# Patient Record
Sex: FEMALE | Race: WHITE | NOT HISPANIC OR LATINO | Employment: FULL TIME | URBAN - METROPOLITAN AREA
[De-identification: names, ages, dates, MRNs, and addresses within clinical notes are randomized per-mention and may not be internally consistent; named-entity substitution may affect disease eponyms.]

---

## 2017-08-30 ENCOUNTER — TELEPHONE (OUTPATIENT)
Dept: FAMILY MEDICINE | Facility: CLINIC | Age: 47
End: 2017-08-30

## 2017-08-30 NOTE — TELEPHONE ENCOUNTER
Patient has been called and advised that we will add her to the wait list for a new patient appointment, she has verbalized full understanding.

## 2017-08-30 NOTE — TELEPHONE ENCOUNTER
----- Message from Jean-Pierre Marshall sent at 8/30/2017  2:49 PM CDT -----  Contact: same  Patient called in and stated her PCP referred her to see Dr. Newman and stated she has thyroid issues and also Type 2 diabetes.  Patient stated she is on medication but not insulin.  Patient would like a call back to schedule an appt at [phone]

## 2018-10-26 ENCOUNTER — TELEPHONE (OUTPATIENT)
Dept: UROLOGY | Facility: CLINIC | Age: 48
End: 2018-10-26

## 2018-10-26 NOTE — TELEPHONE ENCOUNTER
Spoke with patient informed her referral received from [facility]. Patient states she will call back to schedule a appointment.

## 2018-10-29 ENCOUNTER — TELEPHONE (OUTPATIENT)
Dept: UROLOGY | Facility: CLINIC | Age: 48
End: 2018-10-29

## 2018-10-29 NOTE — TELEPHONE ENCOUNTER
----- Message from Helen Peters sent at 10/29/2018  4:13 PM CDT -----  Contact: Self  Type:  Patient Returning Call    Who Called:  Patient   Who Left Message for Patient:  Nathaly   Does the patient know what this is regarding?:    Best Call Back Number:  [phone] (home)     Additional Information:

## 2018-10-29 NOTE — TELEPHONE ENCOUNTER
Please let her know that I can see her next Monday at 11:30am.   In the meantime please have her images uploaded and let me know once they have been uploaded.

## 2018-10-29 NOTE — TELEPHONE ENCOUNTER
Spoke with patient called patient on 10/26 to offer appointment with  patient declined appointment. Patient calling today requesting appointment with  for kidney stones appointment scheduled on 11/19. Patient proceeds to tell me a story about how her family talked her into scheduling appointment with  and she sat in 's office for 2 hours and was then told he was in surgery and patient cannot be seen. Patient requesting sooner appointment. Paperwork placed in review folder

## 2018-10-30 NOTE — TELEPHONE ENCOUNTER
Spoke with patient appointment scheduled for Monday 11/5 at 11am. Patient verbally voiced understanding.

## 2018-11-05 ENCOUNTER — HOSPITAL ENCOUNTER (OUTPATIENT)
Dept: RADIOLOGY | Facility: HOSPITAL | Age: 48
Discharge: HOME | End: 2018-11-05
Attending: UROLOGY
Payer: COMMERCIAL

## 2018-11-05 ENCOUNTER — OFFICE VISIT (OUTPATIENT)
Dept: UROLOGY | Facility: CLINIC | Age: 48
End: 2018-11-05
Payer: COMMERCIAL

## 2018-11-05 VITALS
DIASTOLIC BLOOD PRESSURE: 89 MMHG | SYSTOLIC BLOOD PRESSURE: 157 MMHG | WEIGHT: 148.5 LBS | HEIGHT: 65 IN | HEART RATE: 108 BPM | BODY MASS INDEX: 24.74 KG/M2

## 2018-11-05 DIAGNOSIS — R31.0 GROSS HEMATURIA: ICD-10-CM

## 2018-11-05 DIAGNOSIS — R31.0 GROSS HEMATURIA: Primary | ICD-10-CM

## 2018-11-05 DIAGNOSIS — N20.0 NEPHROLITHIASIS: ICD-10-CM

## 2018-11-05 DIAGNOSIS — N39.41 URGE INCONTINENCE OF URINE: ICD-10-CM

## 2018-11-05 LAB
BACTERIA #/AREA URNS HPF: ABNORMAL /HPF
BILIRUB UR QL STRIP: NEGATIVE
CLARITY UR: CLEAR
COLOR UR: YELLOW
GLUCOSE UR QL STRIP: ABNORMAL
HGB UR QL STRIP: ABNORMAL
KETONES UR QL STRIP: NEGATIVE
LEUKOCYTE ESTERASE UR QL STRIP: NEGATIVE
MICROSCOPIC COMMENT: ABNORMAL
NITRITE UR QL STRIP: NEGATIVE
PH UR STRIP: 7 [PH] (ref 5–8)
PROT UR QL STRIP: ABNORMAL
RBC #/AREA URNS HPF: >100 /HPF (ref 0–4)
SP GR UR STRIP: 1.02 (ref 1–1.03)
SQUAMOUS #/AREA URNS HPF: 2 /HPF
URN SPEC COLLECT METH UR: ABNORMAL
UROBILINOGEN UR STRIP-ACNC: NEGATIVE EU/DL
WBC #/AREA URNS HPF: 3 /HPF (ref 0–5)

## 2018-11-05 PROCEDURE — 99245 OFF/OP CONSLTJ NEW/EST HI 55: CPT | Mod: 25,S$GLB,, | Performed by: UROLOGY

## 2018-11-05 PROCEDURE — 99999 PR PBB SHADOW E&M-EST. PATIENT-LVL V: CPT | Mod: PBBFAC,,, | Performed by: UROLOGY

## 2018-11-05 PROCEDURE — 81000 URINALYSIS NONAUTO W/SCOPE: CPT

## 2018-11-05 PROCEDURE — 74018 RADEX ABDOMEN 1 VIEW: CPT | Mod: TC,FY

## 2018-11-05 PROCEDURE — 51701 INSERT BLADDER CATHETER: CPT | Mod: S$GLB,,, | Performed by: UROLOGY

## 2018-11-05 PROCEDURE — 87086 URINE CULTURE/COLONY COUNT: CPT

## 2018-11-05 PROCEDURE — 74018 RADEX ABDOMEN 1 VIEW: CPT | Mod: 26,,, | Performed by: RADIOLOGY

## 2018-11-05 RX ORDER — TRAMADOL HYDROCHLORIDE 50 MG/1
50 TABLET ORAL
COMMUNITY
Start: 2018-10-25 | End: 2020-06-18 | Stop reason: ALTCHOICE

## 2018-11-05 RX ORDER — TAMSULOSIN HYDROCHLORIDE 0.4 MG/1
0.4 CAPSULE ORAL
Qty: 30 CAPSULE | Refills: 0 | Status: SHIPPED | OUTPATIENT
Start: 2018-11-05 | End: 2018-11-28 | Stop reason: SDUPTHER

## 2018-11-05 RX ORDER — HYDROCHLOROTHIAZIDE 25 MG/1
TABLET ORAL
COMMUNITY
Start: 2018-08-06

## 2018-11-05 RX ORDER — LISDEXAMFETAMINE DIMESYLATE 50 MG/1
50 CAPSULE ORAL
COMMUNITY
Start: 2018-10-25 | End: 2021-01-14

## 2018-11-05 RX ORDER — BENAZEPRIL HYDROCHLORIDE 40 MG/1
TABLET ORAL
COMMUNITY
Start: 2018-08-06 | End: 2020-10-06 | Stop reason: SDUPTHER

## 2018-11-05 RX ORDER — KETOROLAC TROMETHAMINE 10 MG/1
10 TABLET, FILM COATED ORAL EVERY 8 HOURS PRN
Qty: 10 TABLET | Refills: 0 | Status: SHIPPED | OUTPATIENT
Start: 2018-11-05 | End: 2018-11-10

## 2018-11-05 NOTE — PROGRESS NOTES
[facility] Note - abeba clancy  Staff: MD Yakov    Referring provider and please cc:   PC[facility] - will sign up again    Chief Complaint: Left renal stones    Subjective:        HPI: Alis Costello is a 48 y.o. female presents with     Nephrolithiasis and Gross Hematuria  -she has a h/o distal right ureteral stone and had a ureteroscopy and stone extraction on 8/6/12, this was her first stone requiring extraction, prior to this she had 3 or 4 she passed and since then she's had no other stones. About a week and a half ago she had uncomfortable but tolerable pain in his left flank. She went to her pcp and initially had leuk and blood on 10/15/18 and was placed on abx. She then returned a week later 10/25/18 and that urine showed 4+blood. She then had a ctrss on 10/26/18 which showed a 5x7mm L renal pelvis stone and 3 mm LLP stone.  then a She's had intermittent hematuria without dysuria, no fevers, no chills since. No stones on the right. Still continues to have intermittent discomfort.     No h/o smoking and on no aspirin. Has had problems urinating for the past 3 months, some incontinence but has since resolved. She is diabetic on victoza. a1c 6.0     ua today: tr leuk/30 protein/250 glucose/250 blood  ua cath: sent for ua, ua microscopic, culture- no sx   Urine history:   10/25/18 No cx, void: 4+blood  10/15/18 No cx, void: tr leuk/ 4+blood  8/5/12  2+blood    ECOG Status: 0    REVIEW OF SYSTEMS:  General ROS: no fevers, no chills  Psychological ROS: no depression  Endocrine ROS: no heat or cold  Respiratory ROS: no SOB  Cardiovascular ROS: no CP  Gastrointestinal ROS: no abdominal pain, no constipation, no diarrhea, noBRBPR  Musculoskeletal ROS: no muscle pain  Neurological ROS: no headaches  Dermatological ROS: no rashes  HEENT: noglasses, no sinus   ROS: per HPI     PMHx:  Past Medical History:   Diagnosis Date    Abnormal Pap smear       Maxine told her it was okay since having hysterectomy.    Anemia     Diabetes mellitus     Thyroid disease      Kidney stones: yes    PSHx:  Past Surgical History:   Procedure Laterality Date    Cystoscope      CYSTOSCOPY, WITH RETROGRADE PYELOGRAM Right 8/6/2012    [facility] OR    DENTAL SURGERY      ENDOMETRIAL ABLATION  2009    HYSTERECTOMY      HYSTERECTOMY-VAGINAL-LAPAROSCOPIC (LAVH) N/A 3/2/2015    [facility]ola [facility] at [facility] OR    REMOVAL, CALCULUS, URETER, URETEROSCOPIC Right 8/6/2012    [facility] at [facility] OR    SINUS SURGERY  2002     Urologic or Gynecologic Surgery: ureteroscopy, hysterectomy     Stents/Valves/Foreign Bodies: No  Cardiac Evaluation: No    Screening Studies  Colonoscopy: none    Fam Hx:   malignancies: No , gyn malignancies: No.  Mother with h/o stroke  kidney stones: Yes - mother      Soc Hx:  No tobacco.   occ alcohol  Lives in [place]  :yes  Children: 2  Occupation:works [employer], finance    Allergies:  Patient has no known allergies.    Medications: reviewed   Anticoagulation: No    Objective:     Vitals:    11/05/18 1123   BP: (!) 157/89   Pulse: 108       General:WDWN in NAD  Eyes: PERRLA, normal conjunctiva  Respiratory: no increased work on breathing. No wheezing.   Cardiovascular: No obvious extremity edema. Warm and well perfused.   GI: no palpation of masses. No tenderness. No hepatosplenomegaly to palpation.  Musculoskeletal: normal range of motion of bilateral upper extremities. Normal muscle strength and tone.  Skin: no obvious rashes or lesions. No tightening of skin noted.  Neurologic: CN grossly normal. Normal sensation.   Psychiatric: awake, alert and oriented x 3. Mood and affect normal. Cooperative.    Pelvic exam  In and out cath today with 80cc` residual, urine sent    LABS REVIEW:      Lab Results   Component Value Date    WBC 6.90 03/02/2015    HGB 10.7 (L)  03/02/2015    HCT 33.2 (L) 03/02/2015    MCV 80 (L) 03/02/2015     03/02/2015     BMP  Lab Results   Component Value Date     02/27/2015    K 3.8 02/27/2015     02/27/2015    CO2 27 02/27/2015    BUN 7 02/27/2015    CREATININE 0.8 02/27/2015    CALCIUM 9.6 02/27/2015    ANIONGAP 6 (L) 02/27/2015    ESTGFRAFRICA >60 02/27/2015    EGFRNONAA >60 02/27/2015         PATHOLOGY REVIEW:  none    RADIOGRAPHIC REVIEW:  ctrss 10/26/18  The right kidney and right ureter are normal.  The left kidney is normal in size.  There is a 3 mm size calculus in the one of the lower pole infundibuli of the left kidney.  There is also a 5 x 7 mm size calculus in the left renal pelvis.  There are no additional calculi in the left ureter and there is no evidence of urinary obstruction.      Assessment:       1. Gross hematuria    2. Nephrolithiasis    3. Urge incontinence of urine          Plan:     Gross hematuria and Left UPJ stone and LLP stone  She has a left renal pelvic stone that is likely going in and out of her upj explaining her intermittent hematuria and pain. Went over eswl vs ureteroscopy. She wants to proceed with ureteroscopy to be able to remove both the left pelvic stone and left lower pole stone.    Start flomax 0.4mg nightly in case stone not visible and need to do ureteroscopy. Explained if we have to do this ureteroscopy may have stent first for 2 weeks and then ureteroscopy 2 weeks later or stent after if able to get to stone. flomax helps stretch ureter to allow instruments and stone passage. Nov 14th.     Send cath urine for urinalysis and culture. She is feeling fatigued but has no uti sx.   Bmp and cbc  kub morning of surgery to ensure it is has not moved     cystoscopy (flexible on the table prior to starting) to finish hematuria workup. Very likely the blood in urine is from the stone.    If urine shows supsicion for infection then needs stent this Wednesday and stone treatment two weeks later.      I have routed a letter back to  for the consult on date of service 11/5/18.               Alexandra Nagy MD

## 2018-11-05 NOTE — PATIENT INSTRUCTIONS
Gross hematuria and Left UPJ stone and LLP stone  She has a left renal pelvic stone that is likely going in and out of her upj explaining her intermittent hematuria and pain. Went over eswl vs ureteroscopy. She wants to proceed with ureteroscopy to be able to remove both the left pelvic stone and left lower pole stone.    Start flomax 0.4mg nightly in case stone not visible and need to do ureteroscopy. Explained if we have to do this ureteroscopy may have stent first for 2 weeks and then ureteroscopy 2 weeks later or stent after if able to get to stone. flomax helps stretch ureter to allow instruments and stone passage. Nov 14th.     Send cath urine for urinalysis and culture. She is feeling fatigued but has no uti sx.   Bmp and cbc  kub morning of surgery to ensure it is has not moved     cystoscopy (flexible on the table prior to starting) to finish hematuria workup. Very likely the blood in urine is from the stone.

## 2018-11-05 NOTE — LETTER
November 5, 2018      Ty Elaine MD  [address]  [place]  [place] [address]           [place] [facility]  [address] [place] [address] [address]  [place] [address]  Phone: [phone]  Fax: [phone]          Patient: Alis Costello   MR Number: [id number]   YOB: 1970   Date of Visit: 11/5/2018       Dear Dr. Ty Elaine:    Thank you for referring Alis Costello to me for evaluation. Attached you will find relevant portions of my assessment and plan of care.    If you have questions, please do not hesitate to call me. I look forward to following Alis Costello along with you.    Sincerely,    Alexandra Nagy MD    Enclosure  CC:  No Recipients    If you would like to receive this communication electronically, please contact [email or web address] or [phone] to request more information on [facility] access.    For providers and/or their staff who would like to refer a patient to [facility], please contact us through our one-stop-shop provider referral line, [facility], at [phone].    If you feel you have received this communication in error or would no longer like to receive these types of communications, please e-mail [email or web address]

## 2018-11-06 LAB — BACTERIA UR CULT: NO GROWTH

## 2018-11-07 ENCOUNTER — PATIENT MESSAGE (OUTPATIENT)
Dept: SURGERY | Facility: HOSPITAL | Age: 48
End: 2018-11-07

## 2018-11-08 ENCOUNTER — TELEPHONE (OUTPATIENT)
Dept: UROLOGY | Facility: CLINIC | Age: 48
End: 2018-11-08

## 2018-11-08 NOTE — TELEPHONE ENCOUNTER
Patient has further questions concerning the eswl, and is communicating with the MD thru the patient email portal.

## 2018-11-08 NOTE — TELEPHONE ENCOUNTER
----- Message from Alexandra Nagy MD sent at 11/8/2018 12:20 AM CST -----  See if she wants to do eswl instead. If so need to let or know to add eswl and possible stent to procedure and remove ureteroscopy.

## 2018-11-13 ENCOUNTER — ANESTHESIA EVENT (OUTPATIENT)
Dept: SURGERY | Facility: HOSPITAL | Age: 48
End: 2018-11-13
Payer: COMMERCIAL

## 2018-11-13 NOTE — PRE-PROCEDURE INSTRUCTIONS
Pre-op phone call made to pt.  All medications reviewed and  pre-procedure  instructions given to pt.  Pt verbalized understanding.

## 2018-11-13 NOTE — H&P
[facility] H[facility]P Note - [place]           l  Staff: MD Yakov  PCP: [facility]      Chief Complaint: Left renal stones     Subjective:        HPI: Alis Costello is a 48 y.o. female presents with      Nephrolithiasis and Gross Hematuria  -she has a h/o distal right ureteral stone and had a ureteroscopy and stone extraction on 8/6/12, this was her first stone requiring extraction, prior to this she had 3 or 4 she passed and since then she's had no other stones. About a week and a half ago she had uncomfortable but tolerable pain in his left flank. She went to her pcp and initially had leuk and blood on 10/15/18 and was placed on abx. She then returned a week later 10/25/18 and that urine showed 4+blood. She then had a ctrss on 10/26/18 which showed a 5x7mm L renal pelvis stone and 3 mm LLP stone.  then a She's had intermittent hematuria without dysuria, no fevers, no chills since. No stones on the right. Still continues to have intermittent discomfort.      No h/o smoking and on no aspirin. Has had problems urinating for the past 3 months, some incontinence but has since resolved. She is diabetic on victoza. a1c 6.0     kub showed stone visible on xray    After discussion of treatment options through in clinic and through epic messaging, because of her low tolerance for pain she has decided on eswl with possible stent if not treated    Urine history:   11/5/18 Ng, cath: 3+blood/void: tr leuk/250 blood  10/25/18          No cx, void: 4+blood  10/15/18          No cx, void: tr leuk/ 4+blood  8/5/12              2+blood     ECOG Status: 0     REVIEW OF SYSTEMS:  General ROS: no fevers, no chills  Psychological ROS: no depression  Endocrine ROS: no heat or cold  Respiratory ROS: no SOB  Cardiovascular ROS: no CP  Gastrointestinal ROS: no abdominal pain, no constipation, no diarrhea, noBRBPR  Musculoskeletal ROS: no muscle pain  Neurological ROS: no headaches  Dermatological  ROS: no rashes  HEENT: noglasses, no sinus   ROS: per HPI     PMHx:       Past Medical History:   Diagnosis Date    Abnormal Pap smear       Dr. Goodman told her it was okay since having hysterectomy.    Anemia      Diabetes mellitus      Thyroid disease        Kidney stones: yes     PSHx:        Past Surgical History:   Procedure Laterality Date    Cystoscope        CYSTOSCOPY, WITH RETROGRADE PYELOGRAM Right 8/6/2012     [facility] at [facility] OR    DENTAL SURGERY        ENDOMETRIAL ABLATION   2009    HYSTERECTOMY        HYSTERECTOMY-VAGINAL-LAPAROSCOPIC ([facility]) N/A 3/2/2015     [facility]ola [facility] at [facility] OR    REMOVAL, CALCULUS, URETER, URETEROSCOPIC Right 8/6/2012     [facility] at [facility] OR    SINUS SURGERY   2002      Urologic or Gynecologic Surgery: ureteroscopy, hysterectomy      Stents/Valves/Foreign Bodies: No  Cardiac Evaluation: No     Screening Studies  Colonoscopy: none     Fam Hx:   malignancies: No , gyn malignancies: No.  Mother with h/o stroke  kidney stones: Yes - mother       Soc Hx:  No tobacco.   occ alcohol  Lives in [place]  :yes  Children: 2  Occupation:works [employer], finance     Allergies:  Patient has no known allergies.     Medications: reviewed   Anticoagulation: No     Objective:      Vitals:    11/14/18 0804   BP: 121/65   Pulse: (!) 54   Resp: 16   Temp: 98.4 °F (36.9 °C)          General:WDWN in NAD  Eyes: PERRLA, normal conjunctiva  Respiratory: no increased work on breathing. No wheezing.   Cardiovascular: No obvious extremity edema. Warm and well perfused.   GI: no palpation of masses. No tenderness. No hepatosplenomegaly to palpation.  Musculoskeletal: normal range of motion of bilateral upper extremities. Normal muscle strength and tone.  Skin: no obvious rashes or lesions. No tightening of skin noted.  Neurologic: CN grossly normal. Normal sensation.   Psychiatric: awake, alert and  oriented x 3. Mood and affect normal. Cooperative.     Pelvic exam 11/5/18  In and out cath with 80cc` residual, urine sent     LABS REVIEW:     BMP  Lab Results   Component Value Date     11/05/2018    K 4.0 11/05/2018     11/05/2018    CO2 27 11/05/2018    BUN 8 11/05/2018    CREATININE 1.1 11/05/2018    CALCIUM 10.1 11/05/2018    ANIONGAP 8 11/05/2018    ESTGFRAFRICA >60 11/05/2018    EGFRNONAA 59 (A) 11/05/2018     Lab Results   Component Value Date    WBC 7.00 11/05/2018    HGB 13.2 11/05/2018    HCT 40.0 11/05/2018    MCV 86 11/05/2018     11/05/2018           PATHOLOGY REVIEW:  none     RADIOGRAPHIC REVIEW:  kub 11/5/18  Irregular projection over the left kidney    ctrss 10/26/18  The right kidney and right ureter are normal.  The left kidney is normal in size.  There is a 3 mm size calculus in the one of the lower pole infundibuli of the left kidney.  There is also a 5 x 7 mm size calculus in the left renal pelvis. SSD 8mm  There are no additional calculi in the left ureter and there is no evidence of urinary obstruction.        Assessment:       1. Gross hematuria    2. Nephrolithiasis    3. Urge incontinence of urine           Plan:      Gross hematuria and Left UPJ stone and LLP stone  She has a left renal pelvic stone that is likely going in and out of her upj explaining her intermittent hematuria and pain. Went over eswl vs ureteroscopy. She wants to proceed with ureteroscopy to be able to remove both the left pelvic stone and left lower pole stone.     Has been on flomax 0.4mg nightly in case stone not visible and need to do ureteroscopy. Explained if we have to do this ureteroscopy may have stent first for 2 weeks and then ureteroscopy 2 weeks later or stent after if able to get to stone. flomax helps stretch ureter to allow instruments and stone passage. Nov 14th.   Because she is very concerned about the pain we have decided to try eswl first and if it does not respond will do  stent and plan for urs 2 weeks later        cystoscopy (flexible on the table prior to starting) to finish hematuria workup. Very likely the blood in urine is from the stone.     If urine shows supsicion for infection then needs stent  First and stone treatment two weeks later.        H&P update  I have reviewed the relevant H&P and reviewed the relevant labs and radiology and updated today's H&P.  The pt is on no anticoagulation and has not been for the past 7 days  Most recent urine culture was on 11/5/18 and was ng.  The patient denies any uti symptoms including fever or burning.

## 2018-11-14 ENCOUNTER — HOSPITAL ENCOUNTER (OUTPATIENT)
Facility: HOSPITAL | Age: 48
Discharge: HOME | End: 2018-11-14
Attending: UROLOGY | Admitting: UROLOGY
Payer: COMMERCIAL

## 2018-11-14 ENCOUNTER — TELEPHONE (OUTPATIENT)
Dept: UROLOGY | Facility: CLINIC | Age: 48
End: 2018-11-14

## 2018-11-14 ENCOUNTER — ANESTHESIA (OUTPATIENT)
Dept: SURGERY | Facility: HOSPITAL | Age: 48
End: 2018-11-14
Payer: COMMERCIAL

## 2018-11-14 VITALS
TEMPERATURE: 98 F | WEIGHT: 148 LBS | BODY MASS INDEX: 24.66 KG/M2 | DIASTOLIC BLOOD PRESSURE: 70 MMHG | HEART RATE: 60 BPM | RESPIRATION RATE: 18 BRPM | OXYGEN SATURATION: 97 % | HEIGHT: 65 IN | SYSTOLIC BLOOD PRESSURE: 136 MMHG

## 2018-11-14 DIAGNOSIS — R31.0 GROSS HEMATURIA: ICD-10-CM

## 2018-11-14 DIAGNOSIS — N39.41 URGE INCONTINENCE OF URINE: ICD-10-CM

## 2018-11-14 DIAGNOSIS — N20.0 NEPHROLITHIASIS: ICD-10-CM

## 2018-11-14 DIAGNOSIS — Z01.818 PREOP TESTING: ICD-10-CM

## 2018-11-14 LAB — POCT GLUCOSE: 101 MG/DL (ref 70–110)

## 2018-11-14 PROCEDURE — D9220A PRA ANESTHESIA: Mod: ANES,,, | Performed by: ANESTHESIOLOGY

## 2018-11-14 PROCEDURE — 71000039 HC RECOVERY, EACH ADD'L HOUR: Performed by: UROLOGY

## 2018-11-14 PROCEDURE — 63600175 PHARM REV CODE 636 W HCPCS: Performed by: NURSE ANESTHETIST, CERTIFIED REGISTERED

## 2018-11-14 PROCEDURE — 37000008 HC ANESTHESIA 1ST 15 MINUTES: Performed by: UROLOGY

## 2018-11-14 PROCEDURE — 99900104 DSU ONLY-NO CHARGE-EA ADD'L HR (STAT): Performed by: UROLOGY

## 2018-11-14 PROCEDURE — 99900103 DSU ONLY-NO CHARGE-INITIAL HR (STAT): Performed by: UROLOGY

## 2018-11-14 PROCEDURE — 25000003 PHARM REV CODE 250: Performed by: NURSE ANESTHETIST, CERTIFIED REGISTERED

## 2018-11-14 PROCEDURE — D9220A PRA ANESTHESIA: Mod: CRNA,,, | Performed by: NURSE ANESTHETIST, CERTIFIED REGISTERED

## 2018-11-14 PROCEDURE — 36000704 HC OR TIME LEV I 1ST 15 MIN: Performed by: UROLOGY

## 2018-11-14 PROCEDURE — 36000705 HC OR TIME LEV I EA ADD 15 MIN: Performed by: UROLOGY

## 2018-11-14 PROCEDURE — 71000015 HC POSTOP RECOV 1ST HR: Performed by: UROLOGY

## 2018-11-14 PROCEDURE — 63600175 PHARM REV CODE 636 W HCPCS: Performed by: UROLOGY

## 2018-11-14 PROCEDURE — 25000003 PHARM REV CODE 250: Performed by: ANESTHESIOLOGY

## 2018-11-14 PROCEDURE — 37000009 HC ANESTHESIA EA ADD 15 MINS: Performed by: UROLOGY

## 2018-11-14 PROCEDURE — 93005 ELECTROCARDIOGRAM TRACING: CPT

## 2018-11-14 PROCEDURE — 71000033 HC RECOVERY, INTIAL HOUR: Performed by: UROLOGY

## 2018-11-14 PROCEDURE — 50590 FRAGMENTING OF KIDNEY STONE: CPT | Mod: LT,,, | Performed by: UROLOGY

## 2018-11-14 PROCEDURE — 63600175 PHARM REV CODE 636 W HCPCS: Performed by: ANESTHESIOLOGY

## 2018-11-14 PROCEDURE — 93010 ELECTROCARDIOGRAM REPORT: CPT | Mod: ,,, | Performed by: INTERNAL MEDICINE

## 2018-11-14 RX ORDER — FENTANYL CITRATE 50 UG/ML
INJECTION, SOLUTION INTRAMUSCULAR; INTRAVENOUS
Status: DISCONTINUED | OUTPATIENT
Start: 2018-11-14 | End: 2018-11-14

## 2018-11-14 RX ORDER — KETOROLAC TROMETHAMINE 10 MG/1
10 TABLET, FILM COATED ORAL EVERY 8 HOURS PRN
Qty: 10 TABLET | Refills: 0 | Status: SHIPPED | OUTPATIENT
Start: 2018-11-14 | End: 2018-11-19

## 2018-11-14 RX ORDER — PROPOFOL 10 MG/ML
VIAL (ML) INTRAVENOUS
Status: DISCONTINUED | OUTPATIENT
Start: 2018-11-14 | End: 2018-11-14

## 2018-11-14 RX ORDER — GLYCOPYRROLATE 0.2 MG/ML
INJECTION INTRAMUSCULAR; INTRAVENOUS
Status: DISCONTINUED | OUTPATIENT
Start: 2018-11-14 | End: 2018-11-14

## 2018-11-14 RX ORDER — OXYCODONE AND ACETAMINOPHEN 5; 325 MG/1; MG/1
1 TABLET ORAL
Status: DISCONTINUED | OUTPATIENT
Start: 2018-11-14 | End: 2018-11-14 | Stop reason: HOSPADM

## 2018-11-14 RX ORDER — DEXAMETHASONE SODIUM PHOSPHATE 4 MG/ML
INJECTION, SOLUTION INTRA-ARTICULAR; INTRALESIONAL; INTRAMUSCULAR; INTRAVENOUS; SOFT TISSUE
Status: DISCONTINUED | OUTPATIENT
Start: 2018-11-14 | End: 2018-11-14

## 2018-11-14 RX ORDER — HYDROCODONE BITARTRATE AND ACETAMINOPHEN 5; 325 MG/1; MG/1
1 TABLET ORAL EVERY 6 HOURS PRN
Qty: 15 TABLET | Refills: 0 | Status: SHIPPED | OUTPATIENT
Start: 2018-11-14 | End: 2018-11-24

## 2018-11-14 RX ORDER — MIDAZOLAM HYDROCHLORIDE 1 MG/ML
INJECTION, SOLUTION INTRAMUSCULAR; INTRAVENOUS
Status: DISCONTINUED | OUTPATIENT
Start: 2018-11-14 | End: 2018-11-14

## 2018-11-14 RX ORDER — ONDANSETRON 2 MG/ML
INJECTION INTRAMUSCULAR; INTRAVENOUS
Status: DISCONTINUED | OUTPATIENT
Start: 2018-11-14 | End: 2018-11-14

## 2018-11-14 RX ORDER — LIDOCAINE HCL/PF 100 MG/5ML
SYRINGE (ML) INTRAVENOUS
Status: DISCONTINUED | OUTPATIENT
Start: 2018-11-14 | End: 2018-11-14

## 2018-11-14 RX ORDER — FENTANYL CITRATE 50 UG/ML
25 INJECTION, SOLUTION INTRAMUSCULAR; INTRAVENOUS EVERY 5 MIN PRN
Status: COMPLETED | OUTPATIENT
Start: 2018-11-14 | End: 2018-11-14

## 2018-11-14 RX ORDER — LIDOCAINE HYDROCHLORIDE 10 MG/ML
1 INJECTION, SOLUTION EPIDURAL; INFILTRATION; INTRACAUDAL; PERINEURAL ONCE
Status: COMPLETED | OUTPATIENT
Start: 2018-11-14 | End: 2018-11-14

## 2018-11-14 RX ORDER — SODIUM CHLORIDE, SODIUM LACTATE, POTASSIUM CHLORIDE, CALCIUM CHLORIDE 600; 310; 30; 20 MG/100ML; MG/100ML; MG/100ML; MG/100ML
INJECTION, SOLUTION INTRAVENOUS CONTINUOUS
Status: DISCONTINUED | OUTPATIENT
Start: 2018-11-14 | End: 2018-11-14 | Stop reason: HOSPADM

## 2018-11-14 RX ORDER — EPHEDRINE SULFATE 50 MG/ML
INJECTION, SOLUTION INTRAVENOUS
Status: DISCONTINUED | OUTPATIENT
Start: 2018-11-14 | End: 2018-11-14

## 2018-11-14 RX ORDER — SODIUM CHLORIDE 0.9 % (FLUSH) 0.9 %
3 SYRINGE (ML) INJECTION
Status: DISCONTINUED | OUTPATIENT
Start: 2018-11-14 | End: 2018-11-14 | Stop reason: HOSPADM

## 2018-11-14 RX ADMIN — FENTANYL CITRATE 25 MCG: 50 INJECTION, SOLUTION INTRAMUSCULAR; INTRAVENOUS at 11:11

## 2018-11-14 RX ADMIN — ONDANSETRON 4 MG: 2 INJECTION, SOLUTION INTRAMUSCULAR; INTRAVENOUS at 10:11

## 2018-11-14 RX ADMIN — LIDOCAINE HYDROCHLORIDE 10 MG: 10 INJECTION, SOLUTION EPIDURAL; INFILTRATION; INTRACAUDAL; PERINEURAL at 08:11

## 2018-11-14 RX ADMIN — MIDAZOLAM 2 MG: 1 INJECTION INTRAMUSCULAR; INTRAVENOUS at 09:11

## 2018-11-14 RX ADMIN — CEFTRIAXONE 1 G: 1 INJECTION, SOLUTION INTRAVENOUS at 09:11

## 2018-11-14 RX ADMIN — SODIUM CHLORIDE, SODIUM LACTATE, POTASSIUM CHLORIDE, AND CALCIUM CHLORIDE: .6; .31; .03; .02 INJECTION, SOLUTION INTRAVENOUS at 09:11

## 2018-11-14 RX ADMIN — GLYCOPYRROLATE 0.2 MG: 0.2 INJECTION, SOLUTION INTRAMUSCULAR; INTRAVENOUS at 10:11

## 2018-11-14 RX ADMIN — FENTANYL CITRATE 50 MCG: 50 INJECTION, SOLUTION INTRAMUSCULAR; INTRAVENOUS at 09:11

## 2018-11-14 RX ADMIN — PROPOFOL 150 MG: 10 INJECTION, EMULSION INTRAVENOUS at 09:11

## 2018-11-14 RX ADMIN — DEXAMETHASONE SODIUM PHOSPHATE 4 MG: 4 INJECTION, SOLUTION INTRAMUSCULAR; INTRAVENOUS at 10:11

## 2018-11-14 RX ADMIN — LIDOCAINE HYDROCHLORIDE 100 MG: 20 INJECTION, SOLUTION INTRAVENOUS at 09:11

## 2018-11-14 RX ADMIN — OXYCODONE AND ACETAMINOPHEN 1 TABLET: 5; 325 TABLET ORAL at 11:11

## 2018-11-14 RX ADMIN — OXYCODONE AND ACETAMINOPHEN 1 TABLET: 5; 325 TABLET ORAL at 10:11

## 2018-11-14 RX ADMIN — EPHEDRINE SULFATE 25 MG: 50 INJECTION, SOLUTION INTRAMUSCULAR; INTRAVENOUS; SUBCUTANEOUS at 10:11

## 2018-11-14 NOTE — OP NOTE
[facility]  Operative Note    Date: 11/14/2018    Pre-Op Diagnosis: Left renal stone, 1cm renal pelvic stone and 3mm LMP stone    Post-Op Diagnosis: same    Procedure(s) Performed:   1.  Left ESWL    Specimen(s): none    Staff Surgeon:  Alexandra Nagy MD    Anesthesia: Monitored Local Anesthesia with Sedation    Indications: Alis Costello is a 48 y.o. female with a  left renal stone x2 presenting for definitive stone management.  The stones are radio-opaque on KUB.      Findings:   The stone is located in the left renal pelvis and measures about 1cm and another in the LMP close to this stone which measures about 3mm. 7cm SSD. Could not calculate HU.  After 2000 shocks at 1 shock per second the left renal pelvic stone was no longer present. The remaining 1000 shocks were delivered at 2 shocks per second and this stone too was fragmented by end of the procedure. Because of this I decided not to leave stent.    Estimated Blood Loss: none    Drains: none    Procedure in Detail:  After risk, benefits, and possible complications of ESWL were discussed, the patient elected to undergo the procedure and informed consent was obtained. All questions were answered in the pre-operative area and the patient was transferred to the lithotripsy suite and placed on the lithotriptor table. SCDs were applied and working.  Time out was preformed, vaughn-procedural antibiotics were administered.    The patient's left rneal stone was aligned on the X-Y- and Z axis and left ESWL was begun after general anesthesia was administered.  When the patient was adequately sedated, 2000 thousand shocks were administered at a rate of 1 shock per second to the left renal pelvic stone, beginning at 16 KV of power and advanced to 26 KV.. After this was adequately treated I then targeted the left mid pole stone at 1000 shocks per second,  Intermittent fluoroscopy was used to monitor fragmentation of the stone.    At the  end of the procedure the stones were both fragmented. Left renal pelvic stone no longer present. .      The patient tolerated the procedure well and was transferred to the PACU in stable condition.      Plan:   Give pt urine strainer. Strain all urine and bring to appt  Return in 4 to 6 weeks with xray beforehand   norco and toradol to take prn for pain  Continue flomax 0.4mg nightly for 2 more weeks            Alexandra Nagy MD

## 2018-11-14 NOTE — ANESTHESIA PREPROCEDURE EVALUATION
11/14/2018  Alis Costello is a 48 y.o., female.    Anesthesia Evaluation    I have reviewed the Patient Summary Reports.    I have reviewed the Nursing Notes.   I have reviewed the Medications.     Review of Systems  Anesthesia Hx:  Denies Family Hx of Anesthesia complications.   Denies Personal Hx of Anesthesia complications.   Social:  Non-Smoker    Cardiovascular:   Hypertension ECG has been reviewed. LVH   Renal/:   renal calculi    Endocrine:   Diabetes, type 2 Hypothyroidism        Physical Exam  General:  Well nourished    Airway/Jaw/Neck:  Airway Findings: Mouth Opening: Normal Tongue: Normal  General Airway Assessment: Adult  Mallampati: II  TM Distance: Normal, at least 6 cm  Jaw/Neck Findings:  Neck ROM: Normal ROM      Dental:  Dental Findings: molar caps   Chest/Lungs:  Chest/Lungs Clear    Heart/Vascular:  Heart Findings: Normal            Anesthesia Plan  Type of Anesthesia, risks & benefits discussed:  Anesthesia Type:  general  Patient's Preference:   Intra-op Monitoring Plan: standard ASA monitors  Intra-op Monitoring Plan Comments:   Post Op Pain Control Plan:   Post Op Pain Control Plan Comments:   Induction:   IV  Beta Blocker:  Patient is not currently on a Beta-Blocker (No further documentation required).       Informed Consent: Patient understands risks and agrees with Anesthesia plan.  Questions answered. Anesthesia consent signed with patient.  ASA Score: 2     Day of Surgery Review of History & Physical:    H&P update referred to the surgeon.         Ready For Surgery From Anesthesia Perspective.

## 2018-11-14 NOTE — ANESTHESIA POSTPROCEDURE EVALUATION
"Anesthesia Post Evaluation    Patient: Alis Costello    Procedure(s) Performed: Procedure(s) (LRB):  LITHOTRIPSY, ESWL (Left)    Final Anesthesia Type: general  Patient location during evaluation: PACU  Patient participation: Yes- Able to Participate  Level of consciousness: awake and alert  Post-procedure vital signs: reviewed and stable  Pain management: adequate  Airway patency: patent  PONV status at discharge: No PONV  Anesthetic complications: no      Cardiovascular status: blood pressure returned to baseline  Respiratory status: unassisted  Hydration status: euvolemic  Follow-up not needed.        Visit Vitals  /70   Pulse 60   Temp 36.7 °C (98.1 °F) (Skin)   Resp 18   Ht 5' 5" (1.651 m)   Wt 67.1 kg (148 lb)   LMP 03/02/2015   SpO2 97%   Breastfeeding? No   BMI 24.63 kg/m²       Pain/Cara Score: Pain Assessment Performed: Yes (11/14/2018 12:24 PM)  Presence of Pain: complains of pain/discomfort (11/14/2018 12:24 PM)  Pain Rating Prior to Med Admin: 5 (11/14/2018 11:42 AM)  Pain Rating Post Med Admin: 4 (11/14/2018 11:45 AM)  Cara Score: 10 (11/14/2018 12:24 PM)        "

## 2018-11-14 NOTE — DISCHARGE SUMMARY
[facility]  Urology  Discharge Note - Short Stay      Patient Name: Alis Costello  MRN: [id number]  Discharge Date and Time:  11/14/2018 11:01 AM  Attending Physician: Alexandra Nagy,*   Discharging Provider: Alexandra Nagy MD  Primary Care Physician: Ty Moran MD    Final Active Diagnoses:    Diagnosis Date Noted POA    Gross hematuria [R31.0] 11/05/2018 Yes      Problems Resolved During this Admission:       Final Diagnoses: Same as principal problem.    Hospital Course: Patient was admitted for an outpatient procedure and tolerated the procedure well with no complications.*    Procedure(s) (LRB):  LITHOTRIPSY, ESWL (Left)     Indwelling Lines/Drains at time of discharge:   Lines/Drains/Airways          None          Discharged Condition: good    Disposition: home    Follow Up:      Patient Instructions:      X-Ray Abdomen AP 1 View   Standing Status: Future Standing Exp. Date: 11/14/19     Order Specific Question Answer Comments   Reason for Exam: L upj and left mid pole stone s/p L eswl      Activity as tolerated       Medications:  Reconciled Home Medications:      Medication List      START taking these medications    HYDROcodone-acetaminophen 5-325 mg per tablet  Commonly known as:  NORCO  Take 1 tablet by mouth every 6 (six) hours as needed for Pain.     ketorolac 10 mg tablet  Commonly known as:  TORADOL  Take 1 tablet (10 mg total) by mouth every 8 (eight) hours as needed for Pain.        CONTINUE taking these medications    benazepril 40 MG tablet  Commonly known as:  LOTENSIN  TAKE 1 TABLET BY MOUTH EVERY DAY AS DIRECTED     clonazePAM 0.5 MG tablet  Commonly known as:  KLONOPIN     hydroCHLOROthiazide 25 MG tablet  Commonly known as:  HYDRODIURIL  TAKE 1 TABLET BY MOUTH EVERY DAY AS DIRECTED.     levothyroxine 112 MCG tablet  Commonly known as:  SYNTHROID     lisdexamfetamine 50 MG capsule  Commonly known as:  VYVANSE  Take 50 mg by mouth.      tamsulosin 0.4 mg Cap  Commonly known as:  FLOMAX  Take 1 capsule (0.4 mg total) by mouth after dinner.     traMADol 50 mg tablet  Commonly known as:  ULTRAM  Take 50 mg by mouth.     VICTOZA 3-JACKIE 0.6 mg/0.1 mL (18 mg/3 mL) Pnij  Generic drug:  liraglutide 0.6 mg/0.1 mL (18 mg/3 mL) subq PNIJ            Discharge Procedure Orders (must include Diet, Follow-up, Activity):   Discharge Procedure Orders (must include Diet, Follow-up, Activity)   X-Ray Abdomen AP 1 View   Standing Status: Future Standing Exp. Date: 11/14/19     Order Specific Question Answer Comments   Reason for Exam: L upj and left mid pole stone s/p L eswl      Activity as tolerated        Give pt urine strainer. Strain all urine and bring to appt  Return in 4 to 6 weeks with xray beforehand   norco and toradol to take prn for pain  Continue flomax 0.4mg nightly for 2 more weeks        Alexandra Nagy MD  Urology  [facility]

## 2018-11-14 NOTE — PLAN OF CARE
Pt ambulated to bathroom and voided qs without difficulty pink urine, no stones obtained.  Strainer and collection jar given to pt for home use.  States that pain is tolerable and denies nausea.  States that she is ready for discharge

## 2018-11-14 NOTE — TELEPHONE ENCOUNTER
----- Message from Alexandra Nagy MD sent at 11/14/2018 11:02 AM CST -----  F/u post-op in 4 to 6 weeks with kub prior. Double book a first appt slot

## 2018-11-14 NOTE — TRANSFER OF CARE
"Anesthesia Transfer of Care Note    Patient: Alis Costello    Procedure(s) Performed: Procedure(s) (LRB):  LITHOTRIPSY, ESWL (Left)    Patient location: PACU    Anesthesia Type: general    Transport from OR: Transported from OR on 2-3 L/min O2 by NC with adequate spontaneous ventilation    Post pain: adequate analgesia    Post assessment: no apparent anesthetic complications and tolerated procedure well    Post vital signs: stable    Level of consciousness: awake    Nausea/Vomiting: no nausea/vomiting    Complications: none    Transfer of care protocol was followed      Last vitals:   Visit Vitals  /65 (BP Location: Left arm, Patient Position: Lying)   Pulse (!) 56   Temp 36.9 °C (98.4 °F) (Skin)   Resp 16   Ht 5' 5" (1.651 m)   Wt 67.1 kg (148 lb)   LMP 03/02/2015   Breastfeeding? No   BMI 24.63 kg/m²     "

## 2018-11-14 NOTE — DISCHARGE INSTRUCTIONS
ESWL post-op instructions:    Give pt urine strainer. Strain all urine and bring fragments to appt  Return in 4 to 6 weeks with xray beforehand   norco and toradol to take prn for pain  Continue flomax 0.4mg nightly for 2 more weeks    Home with:  -urine strainer: strain all urine and bring fragments to appointment so we can send for analysis    Home medications and prescriptions:  -Flomax 0.4mg (Tamsulosin) by mouth nightly to help with stent pain and help pass stones (can cause lightheadness, so take at night) for 2 more weeks  -norco 5 and toradol 10 as needed for pain, alternate -take miralax 17g daily while taking pain medicine  -patient can restart any blood thinners 2 days later if urine is clear yellow, if not, and patient is unsure of when to restart then call office. If planning on having a planned ureteroscopy in two weeks then will remain off blood thinners until then. If patient is scheduled for a cystoscopy stent removal only can start the blood thinners as recommended.     Follow up:  -kub/xray in 4 weeks, must be done prior to follow up: Nathaly, my nurse will schedule  -patient to follow up in clinic with me in 4 weeks  -if patient comes for follow up and has not had a kub/xray then pt needs to call office to schedule kub first, otherwise we will not be able to see how the pt responded to stone treatment.     Other instructions  You may see some blood in your urine while the stone fragments are passing and a few days afterward. Do not be alarmed, even if the urine was clear for a while. Push fluids and refrain from strenuous activity until clearing occurs. If you have difficulty passing clots or don't improve, call us. You can also try sitting in a warm tub of water to help to urinate if needed. Avoid medications such as Aspirin, Advil, Motrin, Plavix, or Coumadin, which thin the blood and cause bleeding.  If you have never had your stones analyzed, strain all urine and bring stone fragments  with you to your next appointment.  Diet:  You may return to your normal diet immediately. Alcohol, spicy foods, acidy foods and drinks with caffeine may cause irritation or frequency and should be used in moderation. To keep your urine flowing freely and to avoid constipation, drink plenty of fluids during the day (8-10 glasses).  Activity:  While the kidney is healing do not engage in strenuous activity. If you are active, you may see more blood in the urine. We would suggest cutting down your activity under these circumstances until the bleeding has stopped, perhaps two weeks.  Bowels:  It is important to keep your bowels regular during the postoperative period. Straining with bowel movements can cause bleeding. A bowel movement every other day is reasonable. Use a mild over-the-counter laxative if needed, such as Milk of Magnesia 2-3 tablespoons, or 1-2 Dulcolax tablets. Call if you continue to have problems. Narcotics can worsen constipation; if you had been taking narcotics for pain, before, during or after your surgery, you may be constipated. Ditropan for bladder spasms may also cause constipation.  Problems you should report to us:  1. Fevers over 101.5 degrees Fahrenheit  2. Inability to urinate.   3. Drug reactions (hives, rash, nausea, vomiting, diarrhea)   4. Severe burning or pain with urination that is not improving.   5. Severe pain in your kidney not relieved with pain medications, go to ER if this occurs   You will also have some burning with urination. This is normal after stone therapy and is also expected while the stent is in place. This burning should be mild or moderate and should improve over time. Severe burning, especially when it is not improving, can be a sign of infection.      Bring stone fragments with you to your next appt.    Call Urology at [phone] with any problems     Discharge Instructions: After Your Surgery/Procedure  Youve just had surgery. During surgery you were given  "medicine called anesthesia to keep you relaxed and free of pain. After surgery you may have some pain or nausea. This is common. Here are some tips for feeling better and getting well after surgery.     Stay on schedule with your medication.   Going home  Your doctor or nurse will show you how to take care of yourself when you go home. He or she will also answer your questions. Have an adult family member or friend drive you home.      For your safety we recommend these precaution for the first 24 hours after your procedure:  · Do not drive or use heavy equipment.  · Do not make important decisions or sign legal papers.  · Do not drink alcohol.  · Have someone stay with you, if needed. He or she can watch for problems and help keep you safe.  · Your concentration, balance, coordination, and judgement may be impaired for many hours after anesthesia.  Use caution when ambulating or standing up.     · You may feel weak and "washed out" after anesthesia and surgery.      Subtle residual effects of general anesthesia or sedation with regional / local anesthesia can last more than 24 hours.  Rest for the remainder of the day or longer if your Doctor/Surgeon has advised you to do so.  Although you may feel normal within the first 24 hours, your reflexes and mental ability may be impaired without you realizing it.  You may feel dizzy, lightheaded or sleepy for 24 hours or longer.      Be sure to go to all follow-up visits with your doctor. And rest after your surgery for as long as your doctor tells you to.  Coping with pain  If you have pain after surgery, pain medicine will help you feel better. Take it as told, before pain becomes severe. Also, ask your doctor or pharmacist about other ways to control pain. This might be with heat, ice, or relaxation. And follow any other instructions your surgeon or nurse gives you.  Tips for taking pain medicine  To get the best relief possible, remember these points:  · Pain medicines " can upset your stomach. Taking them with a little food may help.  · Most pain relievers taken by mouth need at least 20 to 30 minutes to start to work.  · Taking medicine on a schedule can help you remember to take it. Try to time your medicine so that you can take it before starting an activity. This might be before you get dressed, go for a walk, or sit down for dinner.  · Constipation is a common side effect of pain medicines. Call your doctor before taking any medicines such as laxatives or stool softeners to help ease constipation. Also ask if you should skip any foods. Drinking lots of fluids and eating foods such as fruits and vegetables that are high in fiber can also help. Remember, do not take laxatives unless your surgeon has prescribed them.  · Drinking alcohol and taking pain medicine can cause dizziness and slow your breathing. It can even be deadly. Do not drink alcohol while taking pain medicine.  · Pain medicine can make you react more slowly to things. Do not drive or run machinery while taking pain medicine.  Your health care provider may tell you to take acetaminophen to help ease your pain. Ask him or her how much you are supposed to take each day. Acetaminophen or other pain relievers may interact with your prescription medicines or other over-the-counter (OTC) drugs. Some prescription medicines have acetaminophen and other ingredients. Using both prescription and OTC acetaminophen for pain can cause you to overdose. Read the labels on your OTC medicines with care. This will help you to clearly know the list of ingredients, how much to take, and any warnings. It may also help you not take too much acetaminophen. If you have questions or do not understand the information, ask your pharmacist or health care provider to explain it to you before you take the OTC medicine.  Managing nausea  Some people have an upset stomach after surgery. This is often because of anesthesia, pain, or pain medicine,  or the stress of surgery. These tips will help you handle nausea and eat healthy foods as you get better. If you were on a special food plan before surgery, ask your doctor if you should follow it while you get better. These tips may help:  · Do not push yourself to eat. Your body will tell you when to eat and how much.  · Start off with clear liquids and soup. They are easier to digest.  · Next try semi-solid foods, such as mashed potatoes, applesauce, and gelatin, as you feel ready.  · Slowly move to solid foods. Dont eat fatty, rich, or spicy foods at first.  · Do not force yourself to have 3 large meals a day. Instead eat smaller amounts more often.  · Take pain medicines with a small amount of solid food, such as crackers or toast, to avoid nausea.     Call your surgeon if  · You still have pain an hour after taking medicine. The medicine may not be strong enough.  · You feel too sleepy, dizzy, or groggy. The medicine may be too strong.  · You have side effects like nausea, vomiting, or skin changes, such as rash, itching, or hives.       If you have obstructive sleep apnea  You were given anesthesia medicine during surgery to keep you comfortable and free of pain. After surgery, you may have more apnea spells because of this medicine and other medicines you were given. The spells may last longer than usual.   At home:  · Keep using the continuous positive airway pressure (CPAP) device when you sleep. Unless your health care provider tells you not to, use it when you sleep, day or night. CPAP is a common device used to treat obstructive sleep apnea.  · Talk with your provider before taking any pain medicine, muscle relaxants, or sedatives. Your provider will tell you about the possible dangers of taking these medicines.  © 0381-4778 [employer]. [address] [place] [address], [place] [address]. All rights reserved. This information is not intended as a substitute for professional medical care. Always  follow your healthcare professional's instructions.    Post op instructions for prevention of DVT  What is deep vein thrombosis?  Deep vein thrombosis (DVT) is the medical term for blood clots in the deep veins of the leg.  These blood clots can be dangerous.  A DVT can block a blood vessel and keep blood from getting where it needs to go.  Another problem is that the clot can travel to other parts of the body such as the lungs.  A clot that travels to the lungs is called a pulmonary embolus (PE) and can cause serious problems with breathing which can lead to death.  Am I at risk for DVT/PE?  If you are not very active, you are at risk of DVT.  Anyone confined to bed, sitting for long periods of time, recovering from surgery, etc. increases the risk of DVT.  Other risk factors are cancer diagnosis, certain medications, estrogen replacement in any form,older age, obesity, pregnancy, smoking, history of clotting disorders, and dehydration.  How will I know if I have a DVT?   Swelling in the lower leg   Pain   Warmth, redness, hardness or bulging of the vein  If you have any of these symptoms, call your doctors office right away.  Some people will not have any symptoms until the clot moves to the lungs.  What are the symptoms of a PE?   Panting, shortness of breath, or trouble breathing   Sharp, knife-like chest pain when you breathe   Coughing or coughing up blood   Rapid heartbeat  If you have any of these symptoms or get worse quickly, call 911 for emergency treatment.  How can I prevent a DVT?   Avoid long periods of inactivity and dont cross your legs--get up and walk around every hour or so.   Stay active--walking after surgery is highly encouraged.  This means you should get out of the house and walk in the neighborhood.  Going up and down stairs will not impair healing (unless advised against such activity by your doctor).     Drink plenty of noncaffeinated, nonalcoholic fluids each day to prevent  dehydration.   Wear special support stockings, if they have been advised by your doctor.   If you travel, stop at least once an hour and walk around.   Avoid smoking (assistance with stopping is available through your healthcare provider)  Always notify your doctor if you are not able to follow the post operative instructions that are given to you at the time of discharge.  It may be necessary to prescribe one of the medications available to prevent DVT.    We hope your stay was comfortable as you heal now, mend and rest.    For we have enjoyed taking care of you by giving your our best.    And as you get better, by regaining your health and strength;   We count it as a privilege to have served you and hope your time at [facility] was well spent.      Thank  You!!!

## 2018-11-28 RX ORDER — TAMSULOSIN HYDROCHLORIDE 0.4 MG/1
CAPSULE ORAL
Qty: 30 CAPSULE | Refills: 0 | Status: SHIPPED | OUTPATIENT
Start: 2018-11-28 | End: 2020-06-18 | Stop reason: ALTCHOICE

## 2018-12-12 ENCOUNTER — HOSPITAL ENCOUNTER (OUTPATIENT)
Dept: RADIOLOGY | Facility: HOSPITAL | Age: 48
Discharge: HOME | End: 2018-12-12
Attending: UROLOGY
Payer: COMMERCIAL

## 2018-12-12 ENCOUNTER — TELEPHONE (OUTPATIENT)
Dept: UROLOGY | Facility: CLINIC | Age: 48
End: 2018-12-12

## 2018-12-12 DIAGNOSIS — N20.0 NEPHROLITHIASIS: ICD-10-CM

## 2018-12-12 PROCEDURE — 74018 RADEX ABDOMEN 1 VIEW: CPT | Mod: TC,FY

## 2018-12-12 PROCEDURE — 74018 RADEX ABDOMEN 1 VIEW: CPT | Mod: 26,,, | Performed by: RADIOLOGY

## 2018-12-13 ENCOUNTER — OFFICE VISIT (OUTPATIENT)
Dept: UROLOGY | Facility: CLINIC | Age: 48
End: 2018-12-13
Payer: COMMERCIAL

## 2018-12-13 VITALS
SYSTOLIC BLOOD PRESSURE: 154 MMHG | HEIGHT: 66 IN | WEIGHT: 152.56 LBS | BODY MASS INDEX: 24.52 KG/M2 | DIASTOLIC BLOOD PRESSURE: 93 MMHG | HEART RATE: 89 BPM

## 2018-12-13 DIAGNOSIS — N32.81 OAB (OVERACTIVE BLADDER): ICD-10-CM

## 2018-12-13 DIAGNOSIS — N39.46 MIXED INCONTINENCE: ICD-10-CM

## 2018-12-13 DIAGNOSIS — N20.0 NEPHROLITHIASIS: Primary | ICD-10-CM

## 2018-12-13 DIAGNOSIS — R31.0 GROSS HEMATURIA: ICD-10-CM

## 2018-12-13 PROCEDURE — 81002 URINALYSIS NONAUTO W/O SCOPE: CPT | Mod: S$GLB,,, | Performed by: UROLOGY

## 2018-12-13 PROCEDURE — 99999 PR PBB SHADOW E&M-EST. PATIENT-LVL IV: CPT | Mod: PBBFAC,,, | Performed by: UROLOGY

## 2018-12-13 PROCEDURE — 99213 OFFICE O/P EST LOW 20 MIN: CPT | Mod: 24,25,S$GLB, | Performed by: UROLOGY

## 2018-12-13 PROCEDURE — 3008F BODY MASS INDEX DOCD: CPT | Mod: CPTII,S$GLB,, | Performed by: UROLOGY

## 2018-12-13 NOTE — PROGRESS NOTES
[facility] Note - abeba clancy  Staff: MD Yakov  PCP: Dr.Dipo [facility]: active     Chief Complaint: Left renal stones    Subjective:        HPI: Alis Costello is a 48 y.o. female presents with     Nephrolithiasis and Gross Hematuria  -she has a h/o distal right ureteral stone and had a ureteroscopy and stone extraction on 12, this was her first stone requiring extraction, prior to this she had 3 or 4 she passed.  -in 10/2018 she had L flank pain and intermittent gross hematuria and a ctrss on 10/26/18 showed a 5x7mm L renal pelvis stone and 3 mm LLP stone.  No h/o smoking and on no aspirin.  kub 18 and 18 showed stone visible on xray. bs she had low tolererance for pain she underwent L eswl of upj stone and LMP stone on 18 instead of ureteroscopy.     kub today still shows left mid pole stone, no upj stone and possible ureteral stone at L2. Passed some blood fragments, no huge stones. She denies any flank pain and hs no blood in urine. She also states she took bp meds and flomax at same time and became hypotensive    ua today: no blood, rem neg  Urine history:   18            Ng, cath: 3+blood/void: tr leuk/250 blood  10/25/18          No cx, void: 4+blood  10/15/18          No cx, void: tr leuk/ 4+blood  12              2+blood     ECOG Status: 0    oab and incontinence, new problem discussed today  -voids every 3 to 4 hours. Has both TOM and UUI. UUI>TOM. 1 cup of coffee this morning. Diet coke occ. No constipation. No pads. Changes underwear up to a few times a day. kegels no longer working. on diuretic. Sx did not worsen on flomax. She's had this for years. . Vaginal.   -uds 6 ssx: 10  -IIq7 ssx: 3     Overall her sx are not that bothersome      REVIEW OF SYSTEMS:  General ROS: no fevers, no chills  Psychological ROS: no depression  Endocrine ROS: no heat or cold  Respiratory ROS: no SOB  Cardiovascular ROS: no CP  Gastrointestinal ROS: no  abdominal pain, no constipation, no diarrhea, noBRBPR  Musculoskeletal ROS: no muscle pain  Neurological ROS: no headaches  Dermatological ROS: no rashes  HEENT: noglasses, no sinus   ROS: per HPI     PMHx:  Past Medical History:   Diagnosis Date    Abnormal Pap smear     Dr. Goodman told her it was okay since having hysterectomy.    Anemia     Diabetes mellitus     Thyroid disease      Kidney stones: yes    PSHx:  Past Surgical History:   Procedure Laterality Date    Cystoscope      CYSTOSCOPY, WITH RETROGRADE PYELOGRAM Right 8/6/2012    Performed by Julian [facility]aitan [facility] at [facility] OR    DENTAL SURGERY      ENDOMETRIAL ABLATION  2009    EXTRACORPOREAL SHOCK WAVE LITHOTRIPSY Left 11/14/2018    Procedure: LITHOTRIPSY, ESWL;  Surgeon: Alexandra Nagy MD;  Location: [facility] OR;  Service: Urology;  Laterality: Left;    HYSTERECTOMY      HYSTERECTOMY-VAGINAL-LAPAROSCOPIC (LAVH) N/A 3/2/2015    Performed by Marbella Noyola MD at [facility] OR    KNEE ARTHROSCOPY W/ MENISCAL REPAIR      arthroscopy    LITHOTRIPSY, ESWL Left 11/14/2018    Performed by Alexandra Nagy MD at [facility] OR    REMOVAL, CALCULUS, URETER, URETEROSCOPIC Right 8/6/2012    Performed by Julian [facility]aitan MD at [facility] OR    SINUS SURGERY  2002     Urologic or Gynecologic Surgery: ureteroscopy, hysterectomy     Stents/Valves/Foreign Bodies: No  Cardiac Evaluation: No    Screening Studies  Colonoscopy: none    Fam Hx:   malignancies: No , gyn malignancies: No.  Mother with h/o stroke  kidney stones: Yes - mother      Soc Hx:  No tobacco.   occ alcohol  Lives in [place]  :yes  Children: 2  Occupation:works [employer], finance    Allergies:  Patient has no known allergies.    Medications: reviewed   Anticoagulation: No    Objective:     Vitals:    12/13/18 0858   BP: (!) 154/93   Pulse: 89       General:WDWN in NAD  Eyes: PERRLA, normal conjunctiva  Respiratory: no increased work on breathing. No wheezing.    Cardiovascular: No obvious extremity edema. Warm and well perfused.   GI: no palpation of masses. No tenderness. No hepatosplenomegaly to palpation.  Musculoskeletal: normal range of motion of bilateral upper extremities. Normal muscle strength and tone.  Skin: no obvious rashes or lesions. No tightening of skin noted.  Neurologic: CN grossly normal. Normal sensation.   Psychiatric: awake, alert and oriented x 3. Mood and affect normal. Cooperative.    Pelvic exam  In and out cath today with 80cc` residual, urine sent    LABS REVIEW:      Lab Results   Component Value Date    WBC 7.00 11/05/2018    HGB 13.2 11/05/2018    HCT 40.0 11/05/2018    MCV 86 11/05/2018     11/05/2018     BMP  Lab Results   Component Value Date     11/05/2018    K 4.0 11/05/2018     11/05/2018    CO2 27 11/05/2018    BUN 8 11/05/2018    CREATININE 1.1 11/05/2018    CALCIUM 10.1 11/05/2018    ANIONGAP 8 11/05/2018    ESTGFRAFRICA >60 11/05/2018    EGFRNONAA 59 (A) 11/05/2018         PATHOLOGY REVIEW:  none     RADIOGRAPHIC REVIEW:  kub 11/5/18  Irregular projection over the left kidney     ctrss 10/26/18  The right kidney and right ureter are normal.  The left kidney is normal in size.  There is a 3 mm size calculus in the one of the lower pole infundibuli of the left kidney.  There is also a 5 x 7 mm size calculus in the left renal pelvis. SSD 8mm  There are no additional calculi in the left ureter and there is no evidence of urinary obstruction.        Assessment:       1. Nephrolithiasis    2. Gross hematuria    3. OAB (overactive bladder)    4. Mixed incontinence          Plan:     Mixed urinary incontinence  URGE incontinence:   -Avoid dietary irritants (see sheet).  Keep diary x 3-5 days to determine your irritants. -Consider overactive bladder medicine: myrbetriq.  SE profile reviewed.   Takes 2-6 weeks to see if will have effect.  For dry mouth: Try sour, sugar free lozenge or gum or try therabreath products over  the counter.    -hold off on starting myrbetriq for 1 month if these conservative measures do not work  -myrbetriq 4 to 6 weeks to work    STRESS incontinence:    -empty bladder every 2 to 3 hours. Empty well: wait a minute, lean forward on toilet but do  Not strain.    -Do  KEGELS: 10 in AM and 10 in PM, holding each x 10 seconds.  When you feel urge to go, STOP, KEGEL, and when urge has passed, then go to bathroom.    -Consider pelvic floor physical therapy in future.  -if no improvement and very bothersome can consider surgery (mid urethral sling vs referral to urogynecology for pessary)    Gross hematuria and Left UPJ stone and LLP stone  Possible stone in left ureter, L2, unlikely   Appears to be at tip of vertebra, review of other xrays shows similar appearance  Continue flomax for 1 more month and look for stone  Take bp meds in the morning  No c/o left flank pain, no blood in urine  Still has LMP stone, may try to target in future with 3000 shocks  kub and rbus in 3 months and if appears to still have stone present at L2 then consider ct.  Discussed doing ct now vs later and risks of waiting (low), if stone present unlikely to form complete obstruction without symptoms  If symptomatic return sooner (flank pain)  Will hold off on stone workup until f/u in case she does have stone in ureter.         Return in 3 months with kub and rbus  Discuss sx on myrbetriq, may need stress test                Alexandra Nagy MD

## 2018-12-13 NOTE — PATIENT INSTRUCTIONS
Gross hematuria and Left UPJ stone and LLP stone  Possible stone in left ureter, L2, unlikely   Appears to be at tip of vertebra, review of other xrays shows similar appearance  Continue flomax for 1 more month and look for stone  Take bp meds in the morning  No c/o left flank pain, no blood in urine  Still has LMP stone, may try to target in future with 3000 shocks  kub and rbus in 3 months and if appears to still have stone present at L2 then consider ct.  Discussed doing ct now vs later and risks of waiting (low), if stone present unlikely to form complete obstruction without symptoms  If symptomatic return sooner (flank pain)  Will hold off on stone workup until f/u in case she does have stone in ureter.     Mixed urinary incontinence  URGE incontinence:   -Avoid dietary irritants (see sheet).  Keep diary x 3-5 days to determine your irritants. -Consider overactive bladder medicine: myrbetriq.  SE profile reviewed.   Takes 2-6 weeks to see if will have effect.  For dry mouth: Try sour, sugar free lozenge or gum or try therabreath products over the counter.    -hold off on starting myrbetriq for 1 month if these conservative measures do not work  -myrbetriq 4 to 6 weeks to work    STRESS incontinence:    -empty bladder every 2 to 3 hours. Empty well: wait a minute, lean forward on toilet but do  Not strain.    -Do  KEGELS: 10 in AM and 10 in PM, holding each x 10 seconds.  When you feel urge to go, STOP, KEGEL, and when urge has passed, then go to bathroom.    -Consider pelvic floor physical therapy in future.  -if no improvement and very bothersome can consider surgery (mid urethral sling vs referral to urogynecology for pessary)    Return in 3 months with kub and rbus  Discuss sx on myrbetriq, may need stress test

## 2018-12-14 LAB
BILIRUB SERPL-MCNC: NORMAL MG/DL
BLOOD URINE, POC: NORMAL
COLOR, POC UA: YELLOW
GLUCOSE UR QL STRIP: 500
KETONES UR QL STRIP: NORMAL
LEUKOCYTE ESTERASE URINE, POC: NORMAL
NITRITE, POC UA: NORMAL
PH, POC UA: 5.5
PROTEIN, POC: NORMAL
SPECIFIC GRAVITY, POC UA: 1.02
UROBILINOGEN, POC UA: 0.2

## 2019-01-18 ENCOUNTER — HOSPITAL ENCOUNTER (OUTPATIENT)
Dept: RADIOLOGY | Facility: CLINIC | Age: 49
Discharge: HOME | End: 2019-01-18
Attending: INTERNAL MEDICINE
Payer: COMMERCIAL

## 2019-01-18 ENCOUNTER — OFFICE VISIT (OUTPATIENT)
Dept: ENDOCRINOLOGY | Facility: CLINIC | Age: 49
End: 2019-01-18
Payer: COMMERCIAL

## 2019-01-18 ENCOUNTER — LAB VISIT (OUTPATIENT)
Dept: LAB | Facility: HOSPITAL | Age: 49
End: 2019-01-18
Attending: INTERNAL MEDICINE
Payer: COMMERCIAL

## 2019-01-18 VITALS
DIASTOLIC BLOOD PRESSURE: 82 MMHG | HEART RATE: 96 BPM | HEIGHT: 66 IN | BODY MASS INDEX: 25.12 KG/M2 | RESPIRATION RATE: 16 BRPM | WEIGHT: 156.31 LBS | SYSTOLIC BLOOD PRESSURE: 128 MMHG | TEMPERATURE: 98 F

## 2019-01-18 DIAGNOSIS — E11.65 TYPE 2 DIABETES MELLITUS WITH HYPERGLYCEMIA, WITHOUT LONG-TERM CURRENT USE OF INSULIN: ICD-10-CM

## 2019-01-18 DIAGNOSIS — E11.65 TYPE 2 DIABETES MELLITUS WITH HYPERGLYCEMIA, WITHOUT LONG-TERM CURRENT USE OF INSULIN: Primary | ICD-10-CM

## 2019-01-18 DIAGNOSIS — E07.9 THYROID DISEASE: ICD-10-CM

## 2019-01-18 DIAGNOSIS — I10 ESSENTIAL HYPERTENSION: ICD-10-CM

## 2019-01-18 DIAGNOSIS — E55.9 HYPOVITAMINOSIS D: ICD-10-CM

## 2019-01-18 DIAGNOSIS — R31.0 GROSS HEMATURIA: ICD-10-CM

## 2019-01-18 DIAGNOSIS — F90.9 ADULT ADHD: ICD-10-CM

## 2019-01-18 DIAGNOSIS — Z84.1 FAMILY HISTORY OF NEPHROLITHIASIS: ICD-10-CM

## 2019-01-18 DIAGNOSIS — E03.9 HYPOTHYROIDISM, UNSPECIFIED TYPE: ICD-10-CM

## 2019-01-18 DIAGNOSIS — Z87.442 HISTORY OF NEPHROLITHIASIS: ICD-10-CM

## 2019-01-18 DIAGNOSIS — E04.9 GOITER: ICD-10-CM

## 2019-01-18 DIAGNOSIS — Z98.890 STATUS POST ENDOMETRIAL ABLATION: ICD-10-CM

## 2019-01-18 DIAGNOSIS — E06.9 THYROIDITIS: ICD-10-CM

## 2019-01-18 DIAGNOSIS — R79.89 ABNORMAL THYROID BLOOD TEST: ICD-10-CM

## 2019-01-18 LAB
25(OH)D3+25(OH)D2 SERPL-MCNC: 22 NG/ML
ALBUMIN SERPL BCP-MCNC: 4 G/DL
ALP SERPL-CCNC: 83 U/L
ALT SERPL W/O P-5'-P-CCNC: 16 U/L
AMYLASE SERPL-CCNC: 47 U/L
ANION GAP SERPL CALC-SCNC: 8 MMOL/L
AST SERPL-CCNC: 24 U/L
BASOPHILS # BLD AUTO: 0.08 K/UL
BASOPHILS NFR BLD: 1.2 %
BILIRUB SERPL-MCNC: 0.4 MG/DL
BUN SERPL-MCNC: 13 MG/DL
C PEPTIDE SERPL-MCNC: 2.91 NG/ML
CALCIUM SERPL-MCNC: 9.9 MG/DL
CHLORIDE SERPL-SCNC: 107 MMOL/L
CHOLEST SERPL-MCNC: 179 MG/DL
CHOLEST/HDLC SERPL: 3.4 {RATIO}
CO2 SERPL-SCNC: 28 MMOL/L
CREAT SERPL-MCNC: 0.8 MG/DL
DIFFERENTIAL METHOD: NORMAL
EOSINOPHIL # BLD AUTO: 0.2 K/UL
EOSINOPHIL NFR BLD: 3 %
ERYTHROCYTE [DISTWIDTH] IN BLOOD BY AUTOMATED COUNT: 13.3 %
EST. GFR  (AFRICAN AMERICAN): >60 ML/MIN/1.73 M^2
EST. GFR  (NON AFRICAN AMERICAN): >60 ML/MIN/1.73 M^2
ESTIMATED AVG GLUCOSE: 140 MG/DL
FSH SERPL-ACNC: 82 MIU/ML
GLUCOSE SERPL-MCNC: 107 MG/DL
HBA1C MFR BLD HPLC: 6.5 %
HCT VFR BLD AUTO: 40 %
HDLC SERPL-MCNC: 53 MG/DL
HDLC SERPL: 29.6 %
HGB BLD-MCNC: 12.8 G/DL
IMM GRANULOCYTES # BLD AUTO: 0.02 K/UL
IMM GRANULOCYTES NFR BLD AUTO: 0.3 %
LDLC SERPL CALC-MCNC: 110.4 MG/DL
LH SERPL-ACNC: 27.7 MIU/ML
LIPASE SERPL-CCNC: 50 U/L
LYMPHOCYTES # BLD AUTO: 1.4 K/UL
LYMPHOCYTES NFR BLD: 21 %
MCH RBC QN AUTO: 28.6 PG
MCHC RBC AUTO-ENTMCNC: 32 G/DL
MCV RBC AUTO: 89 FL
MONOCYTES # BLD AUTO: 0.4 K/UL
MONOCYTES NFR BLD: 6.4 %
NEUTROPHILS # BLD AUTO: 4.6 K/UL
NEUTROPHILS NFR BLD: 68.1 %
NONHDLC SERPL-MCNC: 126 MG/DL
NRBC BLD-RTO: 0 /100 WBC
PLATELET # BLD AUTO: 286 K/UL
PMV BLD AUTO: 10.7 FL
POTASSIUM SERPL-SCNC: 3.4 MMOL/L
PROT SERPL-MCNC: 7.1 G/DL
PTH-INTACT SERPL-MCNC: 93 PG/ML
RBC # BLD AUTO: 4.48 M/UL
SODIUM SERPL-SCNC: 143 MMOL/L
T3 SERPL-MCNC: 85 NG/DL
T4 FREE SERPL-MCNC: 1.01 NG/DL
THYROGLOB AB SERPL IA-ACNC: 6.4 IU/ML
THYROPEROXIDASE IGG SERPL-ACNC: 434.3 IU/ML
TRIGL SERPL-MCNC: 78 MG/DL
TSH SERPL DL<=0.005 MIU/L-ACNC: 2.59 UIU/ML
URATE SERPL-MCNC: 4.8 MG/DL
WBC # BLD AUTO: 6.73 K/UL

## 2019-01-18 PROCEDURE — 84432 ASSAY OF THYROGLOBULIN: CPT

## 2019-01-18 PROCEDURE — 84439 ASSAY OF FREE THYROXINE: CPT

## 2019-01-18 PROCEDURE — 84550 ASSAY OF BLOOD/URIC ACID: CPT

## 2019-01-18 PROCEDURE — 80053 COMPREHEN METABOLIC PANEL: CPT

## 2019-01-18 PROCEDURE — 86376 MICROSOMAL ANTIBODY EACH: CPT

## 2019-01-18 PROCEDURE — 3074F PR MOST RECENT SYSTOLIC BLOOD PRESSURE < 130 MM HG: ICD-10-PCS | Mod: CPTII,S$GLB,, | Performed by: INTERNAL MEDICINE

## 2019-01-18 PROCEDURE — 84443 ASSAY THYROID STIM HORMONE: CPT

## 2019-01-18 PROCEDURE — 76536 US EXAM OF HEAD AND NECK: CPT | Mod: TC,PO

## 2019-01-18 PROCEDURE — 36415 COLL VENOUS BLD VENIPUNCTURE: CPT | Mod: PO

## 2019-01-18 PROCEDURE — 85025 COMPLETE CBC W/AUTO DIFF WBC: CPT

## 2019-01-18 PROCEDURE — 84378 SUGARS SINGLE QUANT: CPT

## 2019-01-18 PROCEDURE — 83002 ASSAY OF GONADOTROPIN (LH): CPT

## 2019-01-18 PROCEDURE — 3079F DIAST BP 80-89 MM HG: CPT | Mod: CPTII,S$GLB,, | Performed by: INTERNAL MEDICINE

## 2019-01-18 PROCEDURE — 3074F SYST BP LT 130 MM HG: CPT | Mod: CPTII,S$GLB,, | Performed by: INTERNAL MEDICINE

## 2019-01-18 PROCEDURE — 82985 ASSAY OF GLYCATED PROTEIN: CPT

## 2019-01-18 PROCEDURE — 83036 HEMOGLOBIN GLYCOSYLATED A1C: CPT

## 2019-01-18 PROCEDURE — 3008F BODY MASS INDEX DOCD: CPT | Mod: CPTII,S$GLB,, | Performed by: INTERNAL MEDICINE

## 2019-01-18 PROCEDURE — 3079F PR MOST RECENT DIASTOLIC BLOOD PRESSURE 80-89 MM HG: ICD-10-PCS | Mod: CPTII,S$GLB,, | Performed by: INTERNAL MEDICINE

## 2019-01-18 PROCEDURE — 80061 LIPID PANEL: CPT

## 2019-01-18 PROCEDURE — 76536 US EXAM OF HEAD AND NECK: CPT | Mod: 26,,, | Performed by: RADIOLOGY

## 2019-01-18 PROCEDURE — 82150 ASSAY OF AMYLASE: CPT

## 2019-01-18 PROCEDURE — 83001 ASSAY OF GONADOTROPIN (FSH): CPT

## 2019-01-18 PROCEDURE — 82308 ASSAY OF CALCITONIN: CPT

## 2019-01-18 PROCEDURE — 84681 ASSAY OF C-PEPTIDE: CPT

## 2019-01-18 PROCEDURE — 99204 PR OFFICE/OUTPT VISIT, NEW, LEVL IV, 45-59 MIN: ICD-10-PCS | Mod: S$GLB,,, | Performed by: INTERNAL MEDICINE

## 2019-01-18 PROCEDURE — 99204 OFFICE O/P NEW MOD 45 MIN: CPT | Mod: S$GLB,,, | Performed by: INTERNAL MEDICINE

## 2019-01-18 PROCEDURE — 84480 ASSAY TRIIODOTHYRONINE (T3): CPT

## 2019-01-18 PROCEDURE — 3008F PR BODY MASS INDEX (BMI) DOCUMENTED: ICD-10-PCS | Mod: CPTII,S$GLB,, | Performed by: INTERNAL MEDICINE

## 2019-01-18 PROCEDURE — 86800 THYROGLOBULIN ANTIBODY: CPT

## 2019-01-18 PROCEDURE — 82330 ASSAY OF CALCIUM: CPT

## 2019-01-18 PROCEDURE — 86341 ISLET CELL ANTIBODY: CPT | Mod: 91

## 2019-01-18 PROCEDURE — 83690 ASSAY OF LIPASE: CPT

## 2019-01-18 PROCEDURE — 99999 PR PBB SHADOW E&M-EST. PATIENT-LVL IV: CPT | Mod: PBBFAC,,, | Performed by: INTERNAL MEDICINE

## 2019-01-18 PROCEDURE — 83970 ASSAY OF PARATHORMONE: CPT

## 2019-01-18 PROCEDURE — 99999 PR PBB SHADOW E&M-EST. PATIENT-LVL IV: ICD-10-PCS | Mod: PBBFAC,,, | Performed by: INTERNAL MEDICINE

## 2019-01-18 PROCEDURE — 76536 US THYROID: ICD-10-PCS | Mod: 26,,, | Performed by: RADIOLOGY

## 2019-01-18 PROCEDURE — 82306 VITAMIN D 25 HYDROXY: CPT

## 2019-01-18 RX ORDER — METFORMIN HYDROCHLORIDE 500 MG/1
500 TABLET ORAL 2 TIMES DAILY WITH MEALS
Qty: 180 TABLET | Refills: 3 | Status: SHIPPED | OUTPATIENT
Start: 2019-01-18 | End: 2019-08-09

## 2019-01-18 RX ORDER — NAPROXEN SODIUM 220 MG/1
81 TABLET, FILM COATED ORAL DAILY
Refills: 0 | COMMUNITY
Start: 2019-01-18 | End: 2021-01-14

## 2019-01-18 NOTE — PROGRESS NOTES
Subjective:      Patient ID: Alis Costello is a 48 y.o. female.    Chief Complaint:  Diabetes and Hypothyroidism    Patient is a 48 yr old lady with type 2 diabetes and hypothyroidism seen for the first time today.    History of Present Illness    Patient is a 48 yr old seen for initial visit today on account of type 2 diabetes and hypothyroidism on thyroid hormone repletion with LT4 112mcg Qd.  His diabetes treatment at present is with Victoza  1.8mg Dose.  Kindly see attached in the media tab section the results of the recent labs done @ Dr Elaine's office from 12/11/18  CMP is normal,  HBA1c; 6.1, TSH; 0.16 (reduced), Free T3; 3.28 (n) free t4; 1.39 (n), TPO abs; 281 (elevated). Microalbumin; 9.1/g creatinine (n)  LDL from 06/18 showed; 120 with Total chol; 188, trig; 126 HDL; 43.  Her background comorbidities are as detailed below;    1. Type 2 diabetes mellitus with hyperglycemia, without long-term current use of insulin     2. Status post endometrial ablation     3. Gross hematuria     4. Hypothyroidism, unspecified type     5. Thyroiditis     6. Goiter     7. Thyroid disease     8. Abnormal thyroid blood test       Patients baseline [place] score is 4.  Patient has never had a prior sleep study.  Patient does have a history of adult ADHD.  Patient was found to be diabetic in 2015 when she was found to have major problems with profound fatigue and profound weight gain.  Patient has lost 20lbs in the last year.  She also profound hunger.  Patients mother does have diabetes as well. Patients has two paternal aunts who also have diabetes.  Patient has been hypothyroid for ~ 12yrs now.  There is one of her paternal aunts who does have thyroid problems.  Patient does not smoke.  Patient drinks ~ 1 beer a month.  Grav 2 Para 2+0 (2 alive).  She sees Dr Nicole Mejia.  Last appt was with a different ophthalmologist last year; Dr Steve Ho @ [facility]; @ retinopathy at last visit  She has not yet received her  "flu vaccination.  She received pneumovax in 2016.                  Patient is s/p uterine ablation ffed by [facility]        Review of Systems   Constitutional: Negative for activity change, appetite change, chills, diaphoresis, fatigue and unexpected weight change.   HENT: Negative for facial swelling, hearing loss, sore throat, trouble swallowing and voice change.    Eyes: Negative for photophobia, redness and visual disturbance.   Respiratory: Negative for cough and shortness of breath.    Cardiovascular: Negative for chest pain, palpitations and leg swelling.   Gastrointestinal: Negative for abdominal distention, abdominal pain, constipation, diarrhea, nausea and vomiting.   Endocrine: Negative for cold intolerance, heat intolerance, polydipsia, polyphagia and polyuria.   Genitourinary: Negative for difficulty urinating, dysuria, flank pain, frequency, hematuria and urgency.   Musculoskeletal: Negative for arthralgias, back pain, gait problem, joint swelling, myalgias, neck pain and neck stiffness.   Skin: Positive for rash. Negative for color change and pallor.   Neurological: Negative for dizziness, tremors, seizures, syncope, weakness, light-headedness, numbness and headaches.   Hematological: Does not bruise/bleed easily.   Psychiatric/Behavioral: Negative for agitation, confusion, dysphoric mood, hallucinations and sleep disturbance. The patient is not nervous/anxious.        Objective: Ht 5' 6" (1.676 m)   Wt 70.9 kg (156 lb 4.9 oz)   LMP 03/02/2015   BMI 25.23 kg/m²    /82 (BP Location: Right arm, Patient Position: Sitting, BP Method: Medium (Automatic))   Pulse 96   Temp 97.9 °F (36.6 °C) (Oral)   Resp 16   Ht 5' 6" (1.676 m)   Wt 70.9 kg (156 lb 4.9 oz)   LMP 03/02/2015   BMI 25.23 kg/m²  Body surface area is 1.82 meters squared.           Physical Exam   Constitutional: She is oriented to person, place, and time. Vital signs are normal. She appears well-developed and well-nourished. She does " not appear ill. No distress.   Pleasant young lady. Not pale, anicteric and afebrile. Well hydrated. Not in any acute distress.     HENT:   Head: Normocephalic and atraumatic. Not macrocephalic. Head is without right periorbital erythema and without left periorbital erythema. Hair is normal.   Nose: Nose normal.   Mouth/Throat: Oropharynx is clear and moist. Mucous membranes are not pale and not dry. No oropharyngeal exudate.   No nuchal AN   Eyes: Conjunctivae, EOM and lids are normal. Pupils are equal, round, and reactive to light. Right eye exhibits no discharge. Left eye exhibits no discharge. No scleral icterus.   Neck: Trachea normal, normal range of motion, full passive range of motion without pain and phonation normal. Neck supple. Normal carotid pulses and no JVD present. Carotid bruit is not present. No tracheal deviation present. No thyroid mass and no thyromegaly present.   Cardiovascular: Normal rate, regular rhythm, S1 normal, S2 normal, normal heart sounds, intact distal pulses and normal pulses. PMI is not displaced. Exam reveals no gallop.   No murmur heard.  Pulmonary/Chest: Effort normal and breath sounds normal. No respiratory distress. She has no wheezes. She has no rales.   Abdominal: Soft. Normal appearance and bowel sounds are normal. She exhibits no distension and no mass. There is no hepatosplenomegaly, splenomegaly or hepatomegaly. There is no tenderness. There is no rebound, no guarding and no CVA tenderness. No hernia. Hernia confirmed negative in the ventral area.   Musculoskeletal: She exhibits no edema or tenderness.        Right shoulder: She exhibits normal range of motion, no bony tenderness, no crepitus, no deformity, no pain, no spasm, normal pulse and normal strength.   No pedal edema nor calf swelling.   Lymphadenopathy:     She has no cervical adenopathy.        Right: No inguinal adenopathy present.        Left: No inguinal adenopathy present.   Neurological: She is alert and  oriented to person, place, and time. She has normal strength and normal reflexes. She displays no atrophy, no tremor and normal reflexes. No cranial nerve deficit or sensory deficit. She exhibits normal muscle tone. Coordination and gait normal.   Skin: Skin is warm, dry and intact. No bruising, no ecchymosis, no petechiae and no rash noted. She is not diaphoretic. No cyanosis or erythema. No pallor. Nails show no clubbing.   Psychiatric: She has a normal mood and affect. Her speech is normal and behavior is normal. Judgment and thought content normal. Cognition and memory are normal.   Nursing note and vitals reviewed.      Lab Review:     Results for NICOLETTE HANNA (MRN [id number]) as of 1/18/2019 09:25   Ref. Range 12/12/2018 18:03 12/13/2018 09:01   Glucose, UA Unknown  500   Ketones, UA Unknown  Neg   RBC, UA Unknown  Neg   WBC, UA Unknown  Neg   Bilirubin Unknown  Neg   Protein Unknown  Neg   Nitrite, UA Unknown  Neg   Urobilinogen, UA Unknown  0.2   Spec Grav UA Unknown  1.025   Color, UA Unknown  Yellow   pH, UA Unknown  5.5     Results for NICOLETTE HANNA (MRN [id number]) as of 1/18/2019 09:25   Ref. Range 2/27/2015 15:57 11/5/2018 13:21   Sodium Latest Ref Range: 136 - 145 mmol/L  145   Potassium Latest Ref Range: 3.5 - 5.1 mmol/L  4.0   Chloride Latest Ref Range: 95 - 110 mmol/L  110   CO2 Latest Ref Range: 23 - 29 mmol/L  27   Anion Gap Latest Ref Range: 8 - 16 mmol/L  8   BUN, Bld Latest Ref Range: 6 - 20 mg/dL  8   Creatinine Latest Ref Range: 0.5 - 1.4 mg/dL  1.1   eGFR if non African American Latest Ref Range: >60 mL/min/1.73 m^2  59 (A)   eGFR if African American Latest Ref Range: >60 mL/min/1.73 m^2  >60   Glucose Latest Ref Range: 70 - 110 mg/dL  104   Calcium Latest Ref Range: 8.7 - 10.5 mg/dL  10.1   Preg Test, Ur Unknown Negative      Results for NICOLETTE HANNA (MRN [id number]) as of 1/18/2019 09:25   Ref. Range 11/5/2018 13:21   WBC Latest Ref Range: 3.90 - 12.70 K/uL 7.00   RBC Latest  Ref Range: 4.00 - 5.40 M/uL 4.63   Hemoglobin Latest Ref Range: 12.0 - 16.0 g/dL 13.2   Hematocrit Latest Ref Range: 37.0 - 48.5 % 40.0   MCV Latest Ref Range: 82 - 98 fL 86   MCH Latest Ref Range: 27.0 - 31.0 pg 28.5   MCHC Latest Ref Range: 32.0 - 36.0 g/dL 33.0   RDW Latest Ref Range: 11.5 - 14.5 % 14.1   Platelets Latest Ref Range: 150 - 350 K/uL 323   MPV Latest Ref Range: 9.2 - 12.9 fL 8.7 (L)   Gran% Latest Ref Range: 38.0 - 73.0 % 66.3   Gran # (ANC) Latest Ref Range: 1.8 - 7.7 K/uL 4.7   Lymph% Latest Ref Range: 18.0 - 48.0 % 23.8   Lymph # Latest Ref Range: 1.0 - 4.8 K/uL 1.7   Mono% Latest Ref Range: 4.0 - 15.0 % 6.2   Mono # Latest Ref Range: 0.3 - 1.0 K/uL 0.4   Eosinophil% Latest Ref Range: 0.0 - 8.0 % 3.4   Eos # Latest Ref Range: 0.0 - 0.5 K/uL 0.2   Basophil% Latest Ref Range: 0.0 - 1.9 % 0.3   Baso # Latest Ref Range: 0.00 - 0.20 K/uL 0.00       Assessment:     1. Type 2 diabetes mellitus with hyperglycemia, without long-term current use of insulin  Hemoglobin A1c    Fructosamine    GlycoMark (TM)    Comprehensive metabolic panel    Uric acid    Urinalysis    Microalbumin/creatinine urine ratio    Lipid panel    Calcitonin    Amylase    Lipase    aspirin 81 MG Chew    C-peptide    Diabetes Mellitus Evaluation, Serum   2. Status post endometrial ablation  Luteinizing hormone    Follicle stimulating hormone   3. Gross hematuria     4. Hypothyroidism, unspecified type  CBC auto differential    Uric acid    T4, free    T3    Thyroglobulin    TSH    Thyroid peroxidase antibody    Anti-thyroglobulin antibody   5. Thyroiditis  Thyroid peroxidase antibody    Anti-thyroglobulin antibody   6. Goiter  US Thyroid   7. Thyroid disease  US Thyroid   8. Abnormal thyroid blood test     9. Hypovitaminosis D  Calcium, ionized    Vitamin D    PTH, intact   10. Adult ADHD     11. Essential hypertension  aspirin 81 MG Chew   12. History of nephrolithiasis     13. Family history of nephrolithiasis        Regarding type  2 diabetes; appears well controlled at the moment. Will check current HBA1c. To continue liraglutide monotherapy for now.  To continue SMBG testing 1-2 x daily.  Regarding essential hypertension; BP well controlled. To continue serial tracking of BP trends on ambulatory basosi. No change to present antihypertensive regimen for now.  Regarding hypothyroidism; to continue LT4 112mcg Qd. Will check TFTstoday and adjust LT4 dose as needed based based on results. To obtain screening thyroid USS to exclude any associated thyroid nodular disease.  Regarding Adult ADHD; to continue Vyvanse as before.  Regarding ovarian function status post ANGEL; to check gonadotrophins today.        Plan:       FFup in ~ 4mth.

## 2019-01-19 DIAGNOSIS — E11.69 DYSLIPIDEMIA ASSOCIATED WITH TYPE 2 DIABETES MELLITUS: ICD-10-CM

## 2019-01-19 DIAGNOSIS — E78.00 HYPERCHOLESTEROLEMIA: Primary | ICD-10-CM

## 2019-01-19 DIAGNOSIS — E78.5 DYSLIPIDEMIA ASSOCIATED WITH TYPE 2 DIABETES MELLITUS: ICD-10-CM

## 2019-01-19 PROBLEM — Z78.0 POSTMENOPAUSAL: Status: ACTIVE | Noted: 2019-01-19

## 2019-01-19 PROBLEM — E55.9 HYPOVITAMINOSIS D: Status: ACTIVE | Noted: 2019-01-19

## 2019-01-19 PROBLEM — E06.3 HASHIMOTO'S DISEASE: Status: ACTIVE | Noted: 2019-01-19

## 2019-01-19 LAB — CA-I BLDV-SCNC: 1.35 MMOL/L

## 2019-01-19 RX ORDER — PRAVASTATIN SODIUM 20 MG/1
20 TABLET ORAL DAILY
Qty: 90 TABLET | Refills: 3 | Status: SHIPPED | OUTPATIENT
Start: 2019-01-19 | End: 2019-08-09

## 2019-01-21 LAB
CALCIT SERPL-MCNC: <5 PG/ML
FRUCTOSAMINE SERPL-SCNC: 346 UMOL /L (ref 151–300)
THRYOGLOBULIN INTERPRETATION: ABNORMAL
THYROGLOB AB SERPL-ACNC: <1.8 IU/ML
THYROGLOB SERPL-MCNC: 15 NG/ML

## 2019-01-23 LAB — GLYCOMARK (TM): 3.8 UG/ML

## 2019-02-01 LAB
GAD65 AB SER-SCNC: 0 NMOL/L
IMMUNOLOGIST REVIEW: NORMAL
INSULIN AB SER-SCNC: 0 NMOL/L (ref 0–0.02)
ISLET CELL512 AB SER-ACNC: 0 NMOL/L
Lab: <15 U/ML

## 2019-05-10 ENCOUNTER — OFFICE VISIT (OUTPATIENT)
Dept: ENDOCRINOLOGY | Facility: CLINIC | Age: 49
End: 2019-05-10
Payer: COMMERCIAL

## 2019-05-10 VITALS
HEART RATE: 88 BPM | DIASTOLIC BLOOD PRESSURE: 83 MMHG | BODY MASS INDEX: 23.64 KG/M2 | HEIGHT: 66 IN | SYSTOLIC BLOOD PRESSURE: 118 MMHG | RESPIRATION RATE: 16 BRPM | WEIGHT: 147.06 LBS | TEMPERATURE: 98 F

## 2019-05-10 DIAGNOSIS — E78.00 HYPERCHOLESTEROLEMIA: ICD-10-CM

## 2019-05-10 DIAGNOSIS — E04.9 GOITER: ICD-10-CM

## 2019-05-10 DIAGNOSIS — F90.9 ADULT ADHD: ICD-10-CM

## 2019-05-10 DIAGNOSIS — E06.3 HASHIMOTO'S DISEASE: ICD-10-CM

## 2019-05-10 DIAGNOSIS — I10 ESSENTIAL HYPERTENSION: ICD-10-CM

## 2019-05-10 DIAGNOSIS — Z78.0 POSTMENOPAUSAL: ICD-10-CM

## 2019-05-10 DIAGNOSIS — E03.9 ACQUIRED HYPOTHYROIDISM: ICD-10-CM

## 2019-05-10 DIAGNOSIS — E55.9 HYPOVITAMINOSIS D: ICD-10-CM

## 2019-05-10 DIAGNOSIS — E78.5 HYPERLIPIDEMIA, UNSPECIFIED HYPERLIPIDEMIA TYPE: ICD-10-CM

## 2019-05-10 DIAGNOSIS — E06.9 THYROIDITIS: ICD-10-CM

## 2019-05-10 DIAGNOSIS — E11.65 TYPE 2 DIABETES MELLITUS WITH HYPERGLYCEMIA, WITHOUT LONG-TERM CURRENT USE OF INSULIN: Primary | ICD-10-CM

## 2019-05-10 DIAGNOSIS — D53.9 NUTRITIONAL ANEMIA: ICD-10-CM

## 2019-05-10 PROCEDURE — 3008F PR BODY MASS INDEX (BMI) DOCUMENTED: ICD-10-PCS | Mod: CPTII,S$GLB,, | Performed by: INTERNAL MEDICINE

## 2019-05-10 PROCEDURE — 3074F PR MOST RECENT SYSTOLIC BLOOD PRESSURE < 130 MM HG: ICD-10-PCS | Mod: CPTII,S$GLB,, | Performed by: INTERNAL MEDICINE

## 2019-05-10 PROCEDURE — 3008F BODY MASS INDEX DOCD: CPT | Mod: CPTII,S$GLB,, | Performed by: INTERNAL MEDICINE

## 2019-05-10 PROCEDURE — 99999 PR PBB SHADOW E&M-EST. PATIENT-LVL IV: CPT | Mod: PBBFAC,,, | Performed by: INTERNAL MEDICINE

## 2019-05-10 PROCEDURE — 3074F SYST BP LT 130 MM HG: CPT | Mod: CPTII,S$GLB,, | Performed by: INTERNAL MEDICINE

## 2019-05-10 PROCEDURE — 99214 OFFICE O/P EST MOD 30 MIN: CPT | Mod: S$GLB,,, | Performed by: INTERNAL MEDICINE

## 2019-05-10 PROCEDURE — 3044F PR MOST RECENT HEMOGLOBIN A1C LEVEL <7.0%: ICD-10-PCS | Mod: CPTII,S$GLB,, | Performed by: INTERNAL MEDICINE

## 2019-05-10 PROCEDURE — 3079F PR MOST RECENT DIASTOLIC BLOOD PRESSURE 80-89 MM HG: ICD-10-PCS | Mod: CPTII,S$GLB,, | Performed by: INTERNAL MEDICINE

## 2019-05-10 PROCEDURE — 99214 PR OFFICE/OUTPT VISIT, EST, LEVL IV, 30-39 MIN: ICD-10-PCS | Mod: S$GLB,,, | Performed by: INTERNAL MEDICINE

## 2019-05-10 PROCEDURE — 99999 PR PBB SHADOW E&M-EST. PATIENT-LVL IV: ICD-10-PCS | Mod: PBBFAC,,, | Performed by: INTERNAL MEDICINE

## 2019-05-10 PROCEDURE — 3079F DIAST BP 80-89 MM HG: CPT | Mod: CPTII,S$GLB,, | Performed by: INTERNAL MEDICINE

## 2019-05-10 PROCEDURE — 3044F HG A1C LEVEL LT 7.0%: CPT | Mod: CPTII,S$GLB,, | Performed by: INTERNAL MEDICINE

## 2019-05-10 RX ORDER — LEVOTHYROXINE SODIUM 112 UG/1
112 TABLET ORAL DAILY
Qty: 90 TABLET | Refills: 3 | Status: SHIPPED | OUTPATIENT
Start: 2019-05-10 | End: 2019-05-15

## 2019-05-10 NOTE — PROGRESS NOTES
Subjective:      Patient ID: Alis Costello is a 49 y.o. female.    Chief Complaint:  Diabetes    Patient is a 49 yr old lady with type 2 diabetes and hypothyroidism seen in [facility] today.        History of Present Illness    Patient is a 49 yr old seen for initial visit today on account of type 2 diabetes and hypothyroidism on thyroid hormone repletion with LT4 112mcg Qd.  His diabetes treatment at present is with Victoza  1.8mg Dose and metformin 500mg BID. Her most recent HBA1c from 01/19 was 6.5.  Her background comorbidities are as detailed below;     1. Type 2 diabetes mellitus with hyperglycemia, without long-term current use of insulin      2. Status post endometrial ablation      3. Gross hematuria      4. Hypothyroidism, unspecified type      5. Thyroiditis      6. Goiter      7. Thyroid disease      8. Abnormal thyroid blood test         Patients baseline Philadelphia score is 4.  Patient has never had a prior sleep study.  Patient does have a history of adult ADHD.  Patient was found to be diabetic in 2015 when she was found to have major problems with profound fatigue and profound weight gain.  Patient has lost 20lbs in the last year.  She also profound hunger.  Patients mother does have diabetes as well. Patients has two paternal aunts who also have diabetes.  Patient has been hypothyroid for ~ 12yrs now. She is presently on LT4 112mcg Qd.  Her screening thyroid USS showed not nodular disaese of note and thus she wont need further thyroid donograms unless she develops new clinical findings in the future.  There is one of her paternal aunts who does have thyroid problems.  Patient does not smoke.  Patient drinks ~ 1 beer a month.  Grav 2 Para 2+0 (2 alive).  She sees Dr Nicole Mejia.  Last appt was with a different ophthalmologist last year; Dr Steve Ho @ [facility]; @ retinopathy at last visit  She has not yet received her flu vaccination.  She received pneumovax in 2016.          Review of Systems  "  Constitutional: Negative for activity change, appetite change, chills, diaphoresis, fatigue and unexpected weight change.   HENT: Negative for facial swelling, hearing loss, sore throat, trouble swallowing and voice change.    Eyes: Negative for photophobia, redness and visual disturbance.   Respiratory: Negative for cough and shortness of breath.    Cardiovascular: Negative for chest pain, palpitations and leg swelling.   Gastrointestinal: Negative for abdominal distention, abdominal pain, constipation, diarrhea, nausea and vomiting.   Endocrine: Negative for cold intolerance, heat intolerance, polydipsia, polyphagia and polyuria.   Genitourinary: Negative for difficulty urinating, dysuria, flank pain, frequency, hematuria and urgency.   Musculoskeletal: Positive for arthralgias (intermittent and mainly in small joints of the hands). Negative for back pain, gait problem, joint swelling, myalgias, neck pain and neck stiffness.   Skin: Negative for color change, pallor and rash.   Neurological: Negative for dizziness, tremors, seizures, syncope, weakness, light-headedness, numbness and headaches.   Hematological: Does not bruise/bleed easily.   Psychiatric/Behavioral: Negative for agitation, confusion, dysphoric mood, hallucinations and sleep disturbance. The patient is not nervous/anxious.        Objective: /83 (BP Location: Right arm, Patient Position: Sitting, BP Method: Medium (Automatic))   Pulse 88   Temp 98.2 °F (36.8 °C) (Oral)   Resp 16   Ht 5' 6" (1.676 m)   Wt 66.7 kg (147 lb 0.8 oz)   LMP 03/02/2015   BMI 23.73 kg/m²  Body surface area is 1.76 meters squared.         Physical Exam   Constitutional: She is oriented to person, place, and time. Vital signs are normal. She appears well-developed and well-nourished. She does not appear ill. No distress.   Pleasant young lady. Not pale, anicteric and afebrile. Well hydrated. Not in any acute distress.     HENT:   Head: Normocephalic and " atraumatic. Not macrocephalic. Head is without right periorbital erythema and without left periorbital erythema. Hair is normal.   Nose: Nose normal.   Mouth/Throat: Oropharynx is clear and moist. Mucous membranes are not pale and not dry. No oropharyngeal exudate.   No nuchal AN   Eyes: Pupils are equal, round, and reactive to light. Conjunctivae, EOM and lids are normal. Right eye exhibits no discharge. Left eye exhibits no discharge. No scleral icterus.   Neck: Trachea normal, normal range of motion, full passive range of motion without pain and phonation normal. Neck supple. Normal carotid pulses and no JVD present. Carotid bruit is not present. No tracheal deviation present. No thyroid mass and no thyromegaly present.   Cardiovascular: Normal rate, regular rhythm, S1 normal, S2 normal, normal heart sounds, intact distal pulses and normal pulses. PMI is not displaced.   No murmur heard.  Pulmonary/Chest: Effort normal and breath sounds normal. No stridor. No respiratory distress. She has no wheezes. She has no rales.   Abdominal: Soft. Normal appearance and bowel sounds are normal. She exhibits no distension. There is no hepatosplenomegaly, splenomegaly or hepatomegaly. There is no CVA tenderness. Hernia confirmed negative in the ventral area.   Musculoskeletal: Normal range of motion. She exhibits no edema or tenderness.        Right shoulder: She exhibits normal range of motion, no bony tenderness, no crepitus, no deformity, no pain, no spasm, normal pulse and normal strength.   No pedal edema nor calf swelling.   Lymphadenopathy:     She has no cervical adenopathy.        Right: No inguinal adenopathy present.        Left: No inguinal adenopathy present.   Neurological: She is alert and oriented to person, place, and time. She has normal strength and normal reflexes. She displays no atrophy and no tremor. No cranial nerve deficit. Gait normal.   Skin: Skin is warm, dry and intact. No bruising, no ecchymosis,  no petechiae and no rash noted. She is not diaphoretic. No cyanosis or erythema. No pallor. Nails show no clubbing.   Psychiatric: She has a normal mood and affect. Her speech is normal and behavior is normal. Judgment and thought content normal. Cognition and memory are normal.   Nursing note and vitals reviewed.      Lab Review:     Results for NICOLETTE HANNA (MRN [id number]) as of 5/10/2019 13:34   Ref. Range 1/18/2019 10:27 1/18/2019 10:38 1/18/2019 12:26   WBC Latest Ref Range: 3.90 - 12.70 K/uL  6.73    RBC Latest Ref Range: 4.00 - 5.40 M/uL  4.48    Hemoglobin Latest Ref Range: 12.0 - 16.0 g/dL  12.8    Hematocrit Latest Ref Range: 37.0 - 48.5 %  40.0    MCV Latest Ref Range: 82 - 98 fL  89    MCH Latest Ref Range: 27.0 - 31.0 pg  28.6    MCHC Latest Ref Range: 32.0 - 36.0 g/dL  32.0    RDW Latest Ref Range: 11.5 - 14.5 %  13.3    Platelets Latest Ref Range: 150 - 350 K/uL  286    MPV Latest Ref Range: 9.2 - 12.9 fL  10.7    Gran% Latest Ref Range: 38.0 - 73.0 %  68.1    Gran # (ANC) Latest Ref Range: 1.8 - 7.7 K/uL  4.6    Lymph% Latest Ref Range: 18.0 - 48.0 %  21.0    Lymph # Latest Ref Range: 1.0 - 4.8 K/uL  1.4    Mono% Latest Ref Range: 4.0 - 15.0 %  6.4    Mono # Latest Ref Range: 0.3 - 1.0 K/uL  0.4    Eosinophil% Latest Ref Range: 0.0 - 8.0 %  3.0    Eos # Latest Ref Range: 0.0 - 0.5 K/uL  0.2    Basophil% Latest Ref Range: 0.0 - 1.9 %  1.2    Baso # Latest Ref Range: 0.00 - 0.20 K/uL  0.08    nRBC Latest Ref Range: 0 /100 WBC  0    Differential Method Unknown  Automated    Immature Grans (Abs) Latest Ref Range: 0.00 - 0.04 K/uL  0.02    Immature Granulocytes Latest Ref Range: 0.0 - 0.5 %  0.3    Sodium Latest Ref Range: 136 - 145 mmol/L  143    Potassium Latest Ref Range: 3.5 - 5.1 mmol/L  3.4 (L)    Chloride Latest Ref Range: 95 - 110 mmol/L  107    CO2 Latest Ref Range: 23 - 29 mmol/L  28    Anion Gap Latest Ref Range: 8 - 16 mmol/L  8    BUN, Bld Latest Ref Range: 6 - 20 mg/dL  13     Creatinine Latest Ref Range: 0.5 - 1.4 mg/dL  0.8    eGFR if non African American Latest Ref Range: >60 mL/min/1.73 m^2  >60.0    eGFR if African American Latest Ref Range: >60 mL/min/1.73 m^2  >60.0    Glucose Latest Ref Range: 70 - 110 mg/dL  107    Calcium Latest Ref Range: 8.7 - 10.5 mg/dL  9.9    Calcium, Ion Latest Ref Range: 1.06 - 1.42 mmol/L  1.35    Alkaline Phosphatase Latest Ref Range: 55 - 135 U/L  83    PROTEIN TOTAL Latest Ref Range: 6.0 - 8.4 g/dL  7.1    Albumin Latest Ref Range: 3.5 - 5.2 g/dL  4.0    Uric Acid Latest Ref Range: 2.4 - 5.7 mg/dL  4.8    BILIRUBIN TOTAL Latest Ref Range: 0.1 - 1.0 mg/dL  0.4    AST Latest Ref Range: 10 - 40 U/L  24    ALT Latest Ref Range: 10 - 44 U/L  16    Amylase Latest Ref Range: 20 - 110 U/L  47    Lipase Latest Ref Range: 4 - 60 U/L  50    Triglycerides Latest Ref Range: 30 - 150 mg/dL  78    Cholesterol Latest Ref Range: 120 - 199 mg/dL  179    HDL Latest Ref Range: 40 - 75 mg/dL  53    Hdl/Cholesterol Ratio Latest Ref Range: 20.0 - 50.0 %  29.6    LDL Cholesterol Latest Ref Range: 63.0 - 159.0 mg/dL  110.4    Non-HDL Cholesterol Latest Units: mg/dL  126    Total Cholesterol/HDL Ratio Latest Ref Range: 2.0 - 5.0   3.4    Vit D, 25-Hydroxy Latest Ref Range: 30 - 96 ng/mL  22 (L)    Hemoglobin A1C External Latest Ref Range: 4.0 - 5.6 %  6.5 (H)    Estimated Avg Glucose Latest Ref Range: 68 - 131 mg/dL  140 (H)    C-Peptide Latest Ref Range: 0.78 - 5.19 ng/mL  2.91    Glutamic Acid Decarb Ab Latest Ref Range: <=0.02 nmol/L  0.00    GlycoMark (TM) Latest Units: ug/mL  3.8 (L)    Human Insulin Ab Latest Ref Range: 0.00 - 0.02 nmol/L  0.00    Islet cell 512 Ab Latest Ref Range: <=0.02 nmol/L  0.00    ZnT8 Antibody Latest Ref Range: <15.0 U/mL  <15.0    Diabetes Interpretation Unknown  SEE BELOW    TSH Latest Ref Range: 0.400 - 4.000 uIU/mL  2.590    T3, Total Latest Ref Range: 60 - 180 ng/dL  85    Free T4 Latest Ref Range: 0.71 - 1.51 ng/dL  1.01    Thyroglobulin  Interpretation Unknown  SEE BELOW    Thyroglobulin Antibody Screen Latest Ref Range: <4.0 IU/mL  <1.8    Thyroglobulin, Tumor Marker Latest Units: ng/mL  15 (H)    Thyroperoxidase Antibodies Latest Ref Range: <6.0 IU/mL  434.3 (H)    PTH Latest Ref Range: 9.0 - 77.0 pg/mL  93.0 (H)    Thyroglobulin Ab Screen Latest Ref Range: 0.0 - 3.9 IU/mL  6.4 (H)    Calcitonin Latest Ref Range: <=7.6 pg/mL  <5.0    FSH Latest Ref Range: See Text mIU/mL  82.00    LH Latest Ref Range: See Text mIU/mL  27.7    Specimen UA Unknown Urine, Clean Catch     Color, UA Latest Ref Range: Yellow, Straw, Jina  Yellow     Appearance, UA Latest Ref Range: Clear  Clear     Specific South Range, UA Latest Ref Range: 1.005 - 1.030  1.025     pH, UA Latest Ref Range: 5.0 - 8.0  5.0     Protein, UA Latest Ref Range: Negative  Negative     Glucose, UA Latest Ref Range: Negative  1+ (A)     Ketones, UA Latest Ref Range: Negative  Negative     Occult Blood UA Latest Ref Range: Negative  1+ (A)     NITRITE UA Latest Ref Range: Negative  Negative     Bilirubin (UA) Latest Ref Range: Negative  Negative     Leukocytes, UA Latest Ref Range: Negative  Negative     RBC, UA Latest Ref Range: 0 - 4 /hpf 7 (H)     WBC, UA Latest Ref Range: 0 - 5 /hpf 0     Squam Epithel, UA Latest Units: /hpf 0     Ca Oxalate Cathleen, UA Latest Ref Range: None-Moderate  Occasional     Microscopic Comment Unknown SEE COMMENT     Microalbum.,U,Random Latest Units: ug/mL 15.0     Creatinine, Random Ur Latest Ref Range: 15.0 - 325.0 mg/dL 174.0     MICROALB/CREAT RATIO Latest Ref Range: 0.0 - 30.0 ug/mg 8.6     FRUCTOSAMINE Latest Ref Range: 151 - 300 umol /L  346 (H)        Assessment:     1. Type 2 diabetes mellitus with hyperglycemia, without long-term current use of insulin  Hemoglobin A1c    GlycoMark (TM)    Fructosamine   2. Acquired hypothyroidism     3. Hashimoto's disease     4. Goiter     5. Thyroiditis     6. Postmenopausal     7. Hyperlipidemia, unspecified hyperlipidemia  type  Lipid panel   8. Hypercholesterolemia  Lipid panel   9. Essential hypertension  Lipid panel   10. Adult ADHD     11. Hypovitaminosis D  Vitamin D    Calcium, ionized    PTH, intact   12. Nutritional anemia  Vitamin B12 Deficiency Panel    Vitamin B12    Folate    Folate RBC        Regarding type 2 diabetes; appears well controlled at the moment. Will check current HBA1c. To continue liraglutide  And metformin as before. To continue SMBG testing 1-2 x daily.  Regarding essential hypertension; BP well controlled. To continue serial tracking of BP trends on ambulatory basosi. No change to present antihypertensive regimen for now.  Regarding hypothyroidism; to continue LT4 112mcg Qd nut to change to generic LT4 rather than synthroid to help patient renew medication out of pocket costs. To recheck TFTs  In ~ 2 months  and will adjust LT4 dose as needed based based on results. Regarding Adult ADHD; to continue Vyvanse as before.  Regarding ovarian function status post ANGEL; to check gonadotrophins today.        Plan:     FFup in ~ 4mths

## 2019-05-15 ENCOUNTER — PATIENT MESSAGE (OUTPATIENT)
Dept: ENDOCRINOLOGY | Facility: CLINIC | Age: 49
End: 2019-05-15

## 2019-05-15 RX ORDER — LEVOTHYROXINE SODIUM 150 UG/1
150 TABLET ORAL DAILY
Qty: 90 TABLET | Refills: 3 | Status: SHIPPED | OUTPATIENT
Start: 2019-05-15 | End: 2020-06-18

## 2019-05-15 NOTE — PROGRESS NOTES
There has been an error in the prior Epic recording of the patients LT4 dose.  She has infact been on Synthroid 150mcg daily and not the 112mcg dose that had been detailed in her records. We are now transitioning her to levothyroxine (generic LT4) 150mcg Qd.

## 2019-06-11 ENCOUNTER — PATIENT MESSAGE (OUTPATIENT)
Dept: ENDOCRINOLOGY | Facility: CLINIC | Age: 49
End: 2019-06-11

## 2019-06-11 ENCOUNTER — PATIENT MESSAGE (OUTPATIENT)
Dept: UROLOGY | Facility: CLINIC | Age: 49
End: 2019-06-11

## 2019-06-14 RX ORDER — TOLTERODINE 4 MG/1
4 CAPSULE, EXTENDED RELEASE ORAL DAILY
Qty: 90 CAPSULE | Refills: 3 | Status: SHIPPED | OUTPATIENT
Start: 2019-06-14 | End: 2020-06-18 | Stop reason: ALTCHOICE

## 2019-06-14 NOTE — TELEPHONE ENCOUNTER
Writing for detrol 4mg daily       Sent to pt:Hi! I sent in detrol 4mg daily. Lets try this first.   Should take about 4 to 6 weeks  Lets make a 2 month f/u for you with our np  If no improvement she will trty you on another med.      F/u In 2 motnhs with np  F/u up in 4 months with me

## 2019-07-05 ENCOUNTER — PATIENT MESSAGE (OUTPATIENT)
Dept: ENDOCRINOLOGY | Facility: CLINIC | Age: 49
End: 2019-07-05

## 2019-08-06 ENCOUNTER — PATIENT MESSAGE (OUTPATIENT)
Dept: ENDOCRINOLOGY | Facility: CLINIC | Age: 49
End: 2019-08-06

## 2019-08-09 ENCOUNTER — DOCUMENTATION ONLY (OUTPATIENT)
Dept: ENDOCRINOLOGY | Facility: CLINIC | Age: 49
End: 2019-08-09

## 2019-08-09 ENCOUNTER — TELEPHONE (OUTPATIENT)
Dept: ENDOCRINOLOGY | Facility: CLINIC | Age: 49
End: 2019-08-09

## 2019-08-09 DIAGNOSIS — E78.00 HYPERCHOLESTEROLEMIA: ICD-10-CM

## 2019-08-09 DIAGNOSIS — E11.69 DYSLIPIDEMIA ASSOCIATED WITH TYPE 2 DIABETES MELLITUS: ICD-10-CM

## 2019-08-09 DIAGNOSIS — E78.5 DYSLIPIDEMIA ASSOCIATED WITH TYPE 2 DIABETES MELLITUS: ICD-10-CM

## 2019-08-09 DIAGNOSIS — E11.65 TYPE 2 DIABETES MELLITUS WITH HYPERGLYCEMIA, WITHOUT LONG-TERM CURRENT USE OF INSULIN: ICD-10-CM

## 2019-08-09 RX ORDER — METFORMIN HYDROCHLORIDE 1000 MG/1
1000 TABLET ORAL 2 TIMES DAILY WITH MEALS
Qty: 180 TABLET | Refills: 3 | Status: SHIPPED | OUTPATIENT
Start: 2019-08-09 | End: 2020-03-17

## 2019-08-09 RX ORDER — PRAVASTATIN SODIUM 40 MG/1
40 TABLET ORAL DAILY
Qty: 90 TABLET | Refills: 3 | Status: SHIPPED | OUTPATIENT
Start: 2019-08-09 | End: 2020-09-29

## 2019-08-09 NOTE — TELEPHONE ENCOUNTER
----- Message from Sylvia Weeks sent at 8/9/2019 10:04 AM CDT -----  Type: Needs Medical Advice    Who Called:  [facility] lab - Katie   Symptoms (please be specific):    How long has patient had these symptoms:   Pharmacy name and phone #:  Best Call Back Number: [phone]  Additional Information: The tech need to clarification  an order for a lab for pt. Pt is there at the lab.

## 2019-08-12 ENCOUNTER — PATIENT MESSAGE (OUTPATIENT)
Dept: ENDOCRINOLOGY | Facility: CLINIC | Age: 49
End: 2019-08-12

## 2019-08-12 ENCOUNTER — DOCUMENTATION ONLY (OUTPATIENT)
Dept: ENDOCRINOLOGY | Facility: CLINIC | Age: 49
End: 2019-08-12

## 2019-08-12 NOTE — PROGRESS NOTES
Results of additional lab tests obtained on the patient [facility] from 08/09/19. Details of this are scanned in thethe media tab section of the patients EHR. Salient findings of note; fructoseamine; 336 (0-285), Glycomark; 1.0, I ca 2+; (n), Vit D; 33.64, PTh; 51.1, Total T3; 0.735 (0.97 -1.69)

## 2019-08-23 ENCOUNTER — PATIENT MESSAGE (OUTPATIENT)
Dept: UROLOGY | Facility: CLINIC | Age: 49
End: 2019-08-23

## 2019-09-04 ENCOUNTER — PATIENT MESSAGE (OUTPATIENT)
Dept: UROLOGY | Facility: CLINIC | Age: 49
End: 2019-09-04

## 2020-03-17 ENCOUNTER — OFFICE VISIT (OUTPATIENT)
Dept: ENDOCRINOLOGY | Facility: CLINIC | Age: 50
End: 2020-03-17
Payer: COMMERCIAL

## 2020-03-17 VITALS
DIASTOLIC BLOOD PRESSURE: 84 MMHG | BODY MASS INDEX: 26.2 KG/M2 | HEIGHT: 66 IN | SYSTOLIC BLOOD PRESSURE: 138 MMHG | WEIGHT: 163 LBS | TEMPERATURE: 98 F | HEART RATE: 67 BPM

## 2020-03-17 DIAGNOSIS — E66.3 OVERWEIGHT (BMI 25.0-29.9): ICD-10-CM

## 2020-03-17 DIAGNOSIS — E11.65 TYPE 2 DIABETES MELLITUS WITH HYPERGLYCEMIA, WITHOUT LONG-TERM CURRENT USE OF INSULIN: Primary | ICD-10-CM

## 2020-03-17 DIAGNOSIS — E06.3 HASHIMOTO'S DISEASE: ICD-10-CM

## 2020-03-17 PROCEDURE — 99999 PR PBB SHADOW E&M-EST. PATIENT-LVL III: CPT | Mod: PBBFAC,,, | Performed by: INTERNAL MEDICINE

## 2020-03-17 PROCEDURE — 99214 PR OFFICE/OUTPT VISIT, EST, LEVL IV, 30-39 MIN: ICD-10-PCS | Mod: S$GLB,,, | Performed by: INTERNAL MEDICINE

## 2020-03-17 PROCEDURE — 99999 PR PBB SHADOW E&M-EST. PATIENT-LVL III: ICD-10-PCS | Mod: PBBFAC,,, | Performed by: INTERNAL MEDICINE

## 2020-03-17 PROCEDURE — 99214 OFFICE O/P EST MOD 30 MIN: CPT | Mod: S$GLB,,, | Performed by: INTERNAL MEDICINE

## 2020-03-17 RX ORDER — METFORMIN HYDROCHLORIDE 500 MG/1
1000 TABLET, EXTENDED RELEASE ORAL 2 TIMES DAILY WITH MEALS
Qty: 360 TABLET | Refills: 3 | Status: SHIPPED | OUTPATIENT
Start: 2020-03-17 | End: 2021-11-22 | Stop reason: SDUPTHER

## 2020-03-17 NOTE — LETTER
March 17, 2020        Ty Elaine MD  [address]  [place] [address]             [facility]  [address]  [place] [address]  Phone: [phone]   Patient: Alis Costello   MR Number: [id number]   YOB: 1970   Date of Visit: 3/17/2020       Dear Dr. Elaine:    I saw your patient, Alis Costello, today for evaluation. Attached you will find relevant portions of my assessment and plan of care.       If you have questions, please do not hesitate to call me. I look forward to following Alis Costello along with you.    Sincerely,      Jose Alfredo Chinchilla MD            CC  No Recipients    Enclosure

## 2020-03-17 NOTE — PROGRESS NOTES
Subjective:      Chief Complaint: Diabetes    HPI: Alis Costello is a 50 y.o. female who is here for a follow-up evaluation for diabetes. Last seen 5/2019, though this is her first visit with me.    With regards to the diabetes:  Diagnosed with T2DM since around 2004    Known complications:     Retinopathy? No    Nephropathy? No    Neuropathy? Occasional/rare. Resolved with gabapentin    CAD? No    CVA? No    Current regimen:   Victoza 1.8 mg/day    Missed doses? Yes, probably misses about 2/week.    Prior treatments:    metformin 1,000 mg BID -> GI side effects.    byetta   Saxagliptin    # times a day testing: few per week  Log reviewed: No    Mid-day: 250-350s    Hypoglycemic events? None     Lifestyle modification:   Exercise: Not much lately.   Diet/typical meals: tries to avoid carbs sometimes but not consistently    Also has hypothyroidism:   On 150 mcg/day levothyroxine    Rare missed doses.    Current symptoms: doesn't feel right sometimes   polyuria, polydipsia and vision changes  Pt denies:   nausea, vomit and diarrhea    Diabetes Management Status    Statin: Taking  ACE/ARB: Taking    Screening or Prevention Patient's value Goal Complete/Controlled?   HgA1C Testing and Control   Lab Results   Component Value Date    HGBA1C 6.5 (H) 01/18/2019      q6months/Less than 7% No   Lipid profile : 01/18/2019 Annually No   LDL control Lab Results   Component Value Date    LDLCALC 110.4 01/18/2019    Annually/Less than 100 mg/dl  No   Nephropathy screening Lab Results   Component Value Date    MICALBCREAT 8.6 01/18/2019     Lab Results   Component Value Date    CREATININE 0.8 01/18/2019     Lab Results   Component Value Date    PROTEINUA Negative 01/18/2019    Annually No   Blood pressure BP Readings from Last 1 Encounters:   03/17/20 138/84    Less than 140/90 Yes   Dilated retinal exam : 08/13/2019 Annually No   Foot exam   Most Recent Foot Exam Date: Not Found Annually No     Reviewed past medical, family,  social history and updated as appropriate.    Review of Systems   Constitutional: Positive for unexpected weight change (gaining weight).   HENT: Negative for trouble swallowing.    Eyes: Positive for visual disturbance.   Respiratory: Negative for shortness of breath.    Cardiovascular: Negative for palpitations.   Gastrointestinal: Negative for diarrhea and nausea.   Endocrine: Positive for polydipsia and polyuria.   Genitourinary: Positive for frequency. Negative for dysuria.   Neurological: Negative for light-headedness.   Psychiatric/Behavioral: Positive for sleep disturbance.     Objective:     Vitals:    03/17/20 1137   BP: 138/84   Pulse: 67   Temp: 97.8 °F (36.6 °C)     BP Readings from Last 5 Encounters:   03/17/20 138/84   05/10/19 118/83   01/18/19 128/82   12/13/18 (!) 154/93   11/14/18 136/70     Physical Exam   Constitutional: No distress.   Cardiovascular: Normal heart sounds.   Pulmonary/Chest: Effort normal.   Musculoskeletal: She exhibits no edema.   Vitals reviewed.    Wt Readings from Last 10 Encounters:   03/17/20 1137 73.9 kg (163 lb 0.5 oz)   05/10/19 1320 66.7 kg (147 lb 0.8 oz)   01/18/19 0921 70.9 kg (156 lb 4.9 oz)   12/13/18 0858 69.2 kg (152 lb 8.9 oz)   11/14/18 0804 67.1 kg (148 lb)   11/05/18 1123 67.4 kg (148 lb 7.7 oz)   04/24/15 1609 76.7 kg (169 lb)   03/09/15 1353 75.8 kg (167 lb)   03/02/15 0937 77.1 kg (170 lb)   02/27/15 1426 78.6 kg (173 lb 6 oz)     Lab Results   Component Value Date    HGBA1C 6.5 (H) 01/18/2019     Lab Results   Component Value Date    CHOL 179 01/18/2019    HDL 53 01/18/2019    LDLCALC 110.4 01/18/2019    TRIG 78 01/18/2019    CHOLHDL 29.6 01/18/2019     Lab Results   Component Value Date     01/18/2019    K 3.4 (L) 01/18/2019     01/18/2019    CO2 28 01/18/2019     01/18/2019    BUN 13 01/18/2019    CREATININE 0.8 01/18/2019    CALCIUM 9.9 01/18/2019    PROT 7.1 01/18/2019    ALBUMIN 4.0 01/18/2019    BILITOT 0.4 01/18/2019    ALKPHOS  83 01/18/2019    AST 24 01/18/2019    ALT 16 01/18/2019    ANIONGAP 8 01/18/2019    ESTGFRAFRICA >60.0 01/18/2019    EGFRNONAA >60.0 01/18/2019    TSH 2.590 01/18/2019      Lab Results   Component Value Date    TIERNEYLBCLUDIN 8.6 01/18/2019     Assessment/Plan:     Type 2 diabetes mellitus with hyperglycemia  Reviewed goals of therapy - to get the best control we can without hypoglycemia. Goal <7   - last A1C above goal   - been a while, recheck.  Medication changes:   Stop: victoza  Start: trulicity, Metformin XR (titrate up as tolerated).   - check labs, consider other agents (LU vs insulin/other depending on how high A1c is).   -Advised frequent self blood glucose monitoring. Patient encouraged to document glucose results and bring them to every clinic visit    -Hypoglycemia precautions discussed. Instructed on precautions before driving.    -Close adherence to lifestyle changes recommended.    -Periodic follow ups for eye evaluations, foot care suggested.        Hashimoto's disease  Last TSH normal    - clinically euthyroid   -  Check TSH, adjust dose if needed   - if TSH still normal, will send in a year worth of refills      Overweight (BMI 25.0-29.9)  BMI 26   - complicated by DM2, HTN, HLD   - ensure euthyroid as above   - continue GLP1. Will try for weekly to enhance consistency   - continue to monitor weight        Follow up in about 3 months (around 6/17/2020). likely with repeat labs before

## 2020-03-17 NOTE — ASSESSMENT & PLAN NOTE
BMI 26   - complicated by DM2, HTN, HLD   - ensure euthyroid as above   - continue GLP1. Will try for weekly to enhance consistency   - continue to monitor weight

## 2020-03-17 NOTE — PATIENT INSTRUCTIONS
For the diabetes:   Will see if Trulicity once a week can be covered okay.   Also restart Metformin, extended release. 500 mg once a day for a few days, then go up to 2x/day for a week. If still going okay, you can increase to 1 tablet in AM and 2 tablets in PM. Max dose is 2 tablets twice a day.    Will see how A1C is looking, depending on results may need other agents too:   Options include Amaryl/glimepiride, pioglitazone, jardiance, once a day insulin shots.      Healthy Meals for Diabetes     A healthcare provider will help you develop a meal plan that fits your needs.   Ask your healthcare team to help you make a meal plan that fits your needs. Your meal plan tells you when to eat your meals and snacks, what kinds of foods to eat, and how much of each food to eat. You dont have to give up all the foods you like. But you do need to follow some guidelines.  Choose healthy carbohydrates  Starches, sugars, and fiber are all types of carbohydrates. Fiber can help lower your cholesterol and triglycerides. Fiber is also healthy for your heart. You should have 20 to 35 grams of total fiber each day. Fiber-rich foods include:  · Whole-grain breads and cereals  · Bulgur wheat  · Brown rice     · Whole-wheat pasta  · Fruits and vegetables  · Dry beans, and peas   Keep track of the amount of carbohydrates you eat. This can help you keep the right balance of physical activity and medicine. The amount of carbohydrates needed will vary for each person. It depends on many things such as your health, the medicines you take, and how active you are. Your healthcare team will help you figure out the right amount of carbohydrates for you. You may start with around 45 to 60 grams of carbohydrates per meal, depending on your situation.   Here are some examples of foods containing about 15 grams of carbohydrates (1 serving of carbohydrates):  · 1/2 cup of canned or frozen fruit  · A small piece of fresh fruit (4 ounces)  · 1 slice  of bread  · 1/2 cup of oatmeal  · 1/3 cup of rice  · 4 to 6 crackers  · 1/2 English muffin  · 1/2 cup of black beans  · 1/4 of a large baked potato (3 ounces)  · 2/3 cup of plain fat-free yogurt  · 1 cup of soup  · 1/2 cup of casserole  · 6 chicken nuggets  · 2-inch-square brownie or cake without frosting  · 2 small cookies  · 1/2 cup of ice cream or sherbet  Choose healthy protein foods  Eating protein that is low in fat can help you control your weight. It also helps keep your heart healthy. Low-fat protein foods include:  · Fish  · Plant proteins, such as dry beans and peas, nuts, and soy products like tofu and soymilk  · Lean meat with all visible fat removed  · Poultry with the skin removed  · Low-fat or nonfat milk, cheese, and yogurt  Limit unhealthy fats and sugar  Saturated and trans fats are unhealthy for your heart. They raise LDL (bad) cholesterol. Fat is also high in calories, so it can make you gain weight. To cut down on unhealthy fats and sugar, limit these foods:  · Butter or margarine  · Palm and palm kernel oils and coconut oil  · Cream  · Cheese  · Stiles  · Lunch meats     · Ice cream  · Sweet bakery goods such as pies, muffins, and donuts  · Jams and jellies  · Candy bars  · Regular sodas   How much to eat  The amount of food you eat affects your blood sugar. It also affects your weight. Your healthcare team will tell you how much of each type of food you should eat.  · Use measuring cups and spoons and a food scale to measure serving sizes.  · Learn what a correct serving size looks like on your plate. This will help when you are away from home and cant measure your servings.  · Eat only the number of servings given on your meal plan for each food. Dont take seconds.  · Learn to read food labels. Be sure to look at serving size, total carbohydrates, fiber, calories, sugar, and saturated and trans fats. Look for healthier alternatives to foods that have added sugar.  · Plan ahead for parties  so you can still have a good time without going overboard with unhealthy food choices. Set a good example yourself by bringing a healthy dish to pot darci.   Choose healthy snacks  When it comes to snacks, we usually think about foods with added sugar and fats. But there are many other options for healthier snack choices. Here are a few snack ideas to choose from:  Snacks with less than 5 grams of carbohydrates  · 1 piece of string cheese  · 3 celery sticks plus 1 tablespoon of peanut butter  · 5 cherry tomatoes plus 1 tablespoon of ranch dressing  · 1 hard-boiled egg  · 1/4 cup of fresh blueberries  ·  5 baby carrots  · 1 cup of light popcorn  · 1/2 cup of sugar-free gelatin  · 15 almonds  Snacks with about 10 to 20 grams of carbohydrates  · 1/3 cup of hummus plus 1 cup of fresh cut nonstarchy vegetables (carrots, green peppers, broccoli, celery, or a combination)  · 1/2 cup of fresh or canned fruit plus 1/4 cup of cottage cheese  · 1/2 cup of tuna salad with 4 crackers  · 2 rice cakes and a tablespoon of peanut butter  · 1 small apple or orange  · 3 cups light popcorn  · 1/2 of a turkey sandwich (1 slice of whole-wheat bread, 2 ounces of turkey, and mustard)  Portion sizes are important to controlling your blood sugar and staying at a healthy weight. Stock up on healthy snack items so you always have them on hand.  When to eat  Your meal plan will likely include breakfast, lunch, dinner, and some snacks.  · Try to eat your meals and snacks at about the same times each day.  · Eat all your meals and snacks. Skipping a meal or snack can make your blood sugar drop too low. It can also cause you to eat too much at the next meal or snack. Then your blood sugar could get too high.  Date Last Reviewed: 7/1/2016  © 5084-0725 [employer]. [address] [place] [address], [place] [address]. All rights reserved. This information is not intended as a substitute for professional medical care. Always follow your healthcare  professional's instructions.

## 2020-03-17 NOTE — ASSESSMENT & PLAN NOTE
Reviewed goals of therapy - to get the best control we can without hypoglycemia. Goal <7   - last A1C above goal   - been a while, recheck.  Medication changes:   Stop: victoza  Start: trulicity, Metformin XR (titrate up as tolerated).   - check labs, consider other agents (LU vs insulin/other depending on how high A1c is).   -Advised frequent self blood glucose monitoring. Patient encouraged to document glucose results and bring them to every clinic visit    -Hypoglycemia precautions discussed. Instructed on precautions before driving.    -Close adherence to lifestyle changes recommended.    -Periodic follow ups for eye evaluations, foot care suggested.

## 2020-03-17 NOTE — ASSESSMENT & PLAN NOTE
Last TSH normal    - clinically euthyroid   -  Check TSH, adjust dose if needed   - if TSH still normal, will send in a year worth of refills

## 2020-06-11 ENCOUNTER — LAB VISIT (OUTPATIENT)
Dept: LAB | Facility: HOSPITAL | Age: 50
End: 2020-06-11
Attending: INTERNAL MEDICINE
Payer: COMMERCIAL

## 2020-06-11 DIAGNOSIS — I10 ESSENTIAL HYPERTENSION: ICD-10-CM

## 2020-06-11 DIAGNOSIS — E78.5 HYPERLIPIDEMIA, UNSPECIFIED HYPERLIPIDEMIA TYPE: ICD-10-CM

## 2020-06-11 DIAGNOSIS — E78.00 HYPERCHOLESTEROLEMIA: ICD-10-CM

## 2020-06-11 DIAGNOSIS — E06.3 HASHIMOTO'S DISEASE: ICD-10-CM

## 2020-06-11 DIAGNOSIS — E11.65 TYPE 2 DIABETES MELLITUS WITH HYPERGLYCEMIA, WITHOUT LONG-TERM CURRENT USE OF INSULIN: ICD-10-CM

## 2020-06-11 PROCEDURE — 84443 ASSAY THYROID STIM HORMONE: CPT

## 2020-06-11 PROCEDURE — 83036 HEMOGLOBIN GLYCOSYLATED A1C: CPT

## 2020-06-11 PROCEDURE — 80061 LIPID PANEL: CPT

## 2020-06-11 PROCEDURE — 84439 ASSAY OF FREE THYROXINE: CPT

## 2020-06-11 PROCEDURE — 80048 BASIC METABOLIC PNL TOTAL CA: CPT

## 2020-06-11 PROCEDURE — 36415 COLL VENOUS BLD VENIPUNCTURE: CPT | Mod: PO

## 2020-06-12 LAB
ANION GAP SERPL CALC-SCNC: 12 MMOL/L (ref 8–16)
BUN SERPL-MCNC: 9 MG/DL (ref 6–20)
CALCIUM SERPL-MCNC: 10.1 MG/DL (ref 8.7–10.5)
CHLORIDE SERPL-SCNC: 106 MMOL/L (ref 95–110)
CHOLEST SERPL-MCNC: 169 MG/DL (ref 120–199)
CHOLEST/HDLC SERPL: 3.3 {RATIO} (ref 2–5)
CO2 SERPL-SCNC: 23 MMOL/L (ref 23–29)
CREAT SERPL-MCNC: 0.8 MG/DL (ref 0.5–1.4)
EST. GFR  (AFRICAN AMERICAN): >60 ML/MIN/1.73 M^2
EST. GFR  (NON AFRICAN AMERICAN): >60 ML/MIN/1.73 M^2
ESTIMATED AVG GLUCOSE: 203 MG/DL (ref 68–131)
GLUCOSE SERPL-MCNC: 164 MG/DL (ref 70–110)
HBA1C MFR BLD HPLC: 8.7 % (ref 4–5.6)
HDLC SERPL-MCNC: 51 MG/DL (ref 40–75)
HDLC SERPL: 30.2 % (ref 20–50)
LDLC SERPL CALC-MCNC: 92.2 MG/DL (ref 63–159)
NONHDLC SERPL-MCNC: 118 MG/DL
POTASSIUM SERPL-SCNC: 3.9 MMOL/L (ref 3.5–5.1)
SODIUM SERPL-SCNC: 141 MMOL/L (ref 136–145)
T4 FREE SERPL-MCNC: 1.26 NG/DL (ref 0.71–1.51)
TRIGL SERPL-MCNC: 129 MG/DL (ref 30–150)
TSH SERPL DL<=0.005 MIU/L-ACNC: 0.02 UIU/ML (ref 0.4–4)

## 2020-06-18 ENCOUNTER — OFFICE VISIT (OUTPATIENT)
Dept: ENDOCRINOLOGY | Facility: CLINIC | Age: 50
End: 2020-06-18
Payer: COMMERCIAL

## 2020-06-18 VITALS
SYSTOLIC BLOOD PRESSURE: 130 MMHG | HEIGHT: 66 IN | BODY MASS INDEX: 25.54 KG/M2 | DIASTOLIC BLOOD PRESSURE: 84 MMHG | HEART RATE: 68 BPM | TEMPERATURE: 98 F | WEIGHT: 158.94 LBS

## 2020-06-18 DIAGNOSIS — E06.3 HASHIMOTO'S DISEASE: ICD-10-CM

## 2020-06-18 DIAGNOSIS — B37.9 YEAST INFECTION: Primary | ICD-10-CM

## 2020-06-18 DIAGNOSIS — E11.65 TYPE 2 DIABETES MELLITUS WITH HYPERGLYCEMIA, WITHOUT LONG-TERM CURRENT USE OF INSULIN: ICD-10-CM

## 2020-06-18 DIAGNOSIS — E66.3 OVERWEIGHT (BMI 25.0-29.9): ICD-10-CM

## 2020-06-18 PROCEDURE — 99214 PR OFFICE/OUTPT VISIT, EST, LEVL IV, 30-39 MIN: ICD-10-PCS | Mod: S$GLB,,, | Performed by: INTERNAL MEDICINE

## 2020-06-18 PROCEDURE — 3052F HG A1C>EQUAL 8.0%<EQUAL 9.0%: CPT | Mod: CPTII,S$GLB,, | Performed by: INTERNAL MEDICINE

## 2020-06-18 PROCEDURE — 99214 OFFICE O/P EST MOD 30 MIN: CPT | Mod: S$GLB,,, | Performed by: INTERNAL MEDICINE

## 2020-06-18 PROCEDURE — 99999 PR PBB SHADOW E&M-EST. PATIENT-LVL III: ICD-10-PCS | Mod: PBBFAC,,, | Performed by: INTERNAL MEDICINE

## 2020-06-18 PROCEDURE — 99999 PR PBB SHADOW E&M-EST. PATIENT-LVL III: CPT | Mod: PBBFAC,,, | Performed by: INTERNAL MEDICINE

## 2020-06-18 PROCEDURE — 3052F PR MOST RECENT HEMOGLOBIN A1C LEVEL 8.0 - < 9.0%: ICD-10-PCS | Mod: CPTII,S$GLB,, | Performed by: INTERNAL MEDICINE

## 2020-06-18 RX ORDER — FLUCONAZOLE 150 MG/1
150 TABLET ORAL DAILY
Qty: 2 TABLET | Refills: 0 | Status: SHIPPED | OUTPATIENT
Start: 2020-06-18 | End: 2020-06-19

## 2020-06-18 RX ORDER — LEVOTHYROXINE SODIUM 137 UG/1
137 TABLET ORAL DAILY
Qty: 90 TABLET | Refills: 6 | Status: SHIPPED | OUTPATIENT
Start: 2020-06-18 | End: 2020-10-06 | Stop reason: SDUPTHER

## 2020-06-18 RX ORDER — DULAGLUTIDE 0.75 MG/.5ML
INJECTION, SOLUTION SUBCUTANEOUS
COMMUNITY
Start: 2020-05-16 | End: 2020-06-18

## 2020-06-18 NOTE — ASSESSMENT & PLAN NOTE
BMI 25.6, improving somewhat   - complicated by DM2, HTN, HLD   - increase GLP1   - continue to monitor weight

## 2020-06-18 NOTE — PROGRESS NOTES
Subjective:      Chief Complaint: No chief complaint on file.    HPI: Alis Costello is a 50 y.o. female who is here for a follow-up evaluation for diabetes. Last seen 3/17/2020    With regards to the diabetes:  Diagnosed with T2DM since around 2004     Known complications:     Retinopathy? No    Nephropathy? No    Neuropathy? Occasional/rare. Resolved with gabapentin    CAD? No    CVA? No     Current regimen:   Trulicity 0.75 mg/week   Metformin a few times a week    Missed doses? Yes, often misses metformin    Prior treatments:    metformin 1,000 mg BID -> GI side effects.    byetta   Saxagliptin   victoza -> switched to trulicity   glipizide -> gained weight     # times a day testing: sometimes, last a few weeks ago  Log reviewed: No    Afternoon (after lunch): 180-220s    Hypoglycemic events? None     Pt also has hypothyroidism   On 150 mcg/day levothyroxine    Occasional missed doses. Doesn't take on empty stomach.    Lab Results   Component Value Date    TSH 0.021 (L) 06/11/2020    E1UYEPS 85 01/18/2019    FREET4 1.26 06/11/2020     Current symptoms:   lost a few lbs. Some diarrhea with metformin. Yeast infection currently.    Pt denies:   nausea, constipation.    Diabetes Management Status    Statin: Taking  ACE/ARB: Taking    Screening or Prevention Patient's value Goal Complete/Controlled?   HgA1C Testing and Control   Lab Results   Component Value Date    HGBA1C 8.7 (H) 06/11/2020      q6months/Less than 7% No   Lipid profile : 06/11/2020 Annually Yes   LDL control Lab Results   Component Value Date    LDLCALC 92.2 06/11/2020    Annually/Less than 100 mg/dl  Yes   Nephropathy screening Lab Results   Component Value Date    MICALBCREAT 8.6 01/18/2019     Lab Results   Component Value Date    CREATININE 0.8 06/11/2020     Lab Results   Component Value Date    PROTEINUA Negative 01/18/2019    Annually No   Blood pressure BP Readings from Last 1 Encounters:   06/18/20 130/84    Less than 140/90 Yes    Dilated retinal exam : 08/13/2019 Annually Yes   Foot exam   Most Recent Foot Exam Date: Not Found Annually No     Reviewed past medical, family, social history and updated as appropriate.    Review of Systems   Constitutional: Negative for unexpected weight change (lost a few lbs).   HENT: Negative for trouble swallowing.    Eyes: Negative for visual disturbance.   Respiratory: Negative for shortness of breath.    Cardiovascular: Negative for palpitations.   Gastrointestinal: Positive for diarrhea (sometimes). Negative for constipation.   Endocrine: Negative for polydipsia and polyuria.   Genitourinary:        Yeast infection   Neurological: Positive for dizziness.   Psychiatric/Behavioral: Positive for sleep disturbance.     Objective:     Vitals:    06/18/20 1142   BP: 130/84   Pulse: 68   Temp: 98.3 °F (36.8 °C)     BP Readings from Last 5 Encounters:   06/18/20 130/84   03/17/20 138/84   05/10/19 118/83   01/18/19 128/82   12/13/18 (!) 154/93     Physical Exam  Vitals signs reviewed.   Constitutional:       General: She is not in acute distress.  Neck:      Thyroid: No thyromegaly.   Cardiovascular:      Heart sounds: Normal heart sounds.   Pulmonary:      Effort: Pulmonary effort is normal.         Wt Readings from Last 10 Encounters:   06/18/20 1142 72.1 kg (158 lb 15.2 oz)   03/17/20 1137 73.9 kg (163 lb 0.5 oz)   05/10/19 1320 66.7 kg (147 lb 0.8 oz)   01/18/19 0921 70.9 kg (156 lb 4.9 oz)   12/13/18 0858 69.2 kg (152 lb 8.9 oz)   11/14/18 0804 67.1 kg (148 lb)   11/05/18 1123 67.4 kg (148 lb 7.7 oz)   04/24/15 1609 76.7 kg (169 lb)   03/09/15 1353 75.8 kg (167 lb)   03/02/15 0937 77.1 kg (170 lb)       Lab Results   Component Value Date    HGBA1C 8.7 (H) 06/11/2020     Lab Results   Component Value Date    CHOL 169 06/11/2020    HDL 51 06/11/2020    LDLCALC 92.2 06/11/2020    TRIG 129 06/11/2020    CHOLHDL 30.2 06/11/2020     Lab Results   Component Value Date     06/11/2020    K 3.9 06/11/2020      06/11/2020    CO2 23 06/11/2020     (H) 06/11/2020    BUN 9 06/11/2020    CREATININE 0.8 06/11/2020    CALCIUM 10.1 06/11/2020    PROT 7.1 01/18/2019    ALBUMIN 4.0 01/18/2019    BILITOT 0.4 01/18/2019    ALKPHOS 83 01/18/2019    AST 24 01/18/2019    ALT 16 01/18/2019    ANIONGAP 12 06/11/2020    ESTGFRAFRICA >60.0 06/11/2020    EGFRNONAA >60.0 06/11/2020    TSH 0.021 (L) 06/11/2020      Lab Results   Component Value Date    MICALBCREAT 8.6 01/18/2019       Assessment/Plan:     Type 2 diabetes mellitus with hyperglycemia  Reviewed goals of therapy - to get the best control we can without hypoglycemia. Goal <7   - last A1C increasing further  Medication changes:   Increase trulicity.   Try to take metformin consistently, at least once a day  Discussed with A1C increasing, likely need additional agent - recommend amaryl. Pt not interested at this time, wants to work harder on consistently taking metformin and also improve her diet. Discussed those lifestyle changes can be as effective as a medication, but often times take more work   - pt appears motivated to make changes, so will see how things go with a bit higher trulicity dose and her diet changes.   - repeat labs in a few months, revisit medication changes at that time if needed (though ideally dose increase plus diet could get A1C back down to about 7)  -Advised occasional self blood glucose monitoring. Patient encouraged to document glucose results and bring them to every clinic visit    -Close adherence to lifestyle changes recommended.    -Periodic follow ups for eye evaluations, foot care suggested.      Hashimoto's disease  Last TSH low, free T4 normal   - clinically, not having many hyperthyroid symptoms   - decrease dose to 137   - recheck in 2-3 months or so      Overweight (BMI 25.0-29.9)  BMI 25.6, improving somewhat   - complicated by DM2, HTN, HLD   - increase GLP1   - continue to monitor weight      Yeast infection: Diflucan. Improve  glycemic control as above. F/u with PCP if issues persist.     Follow up in about 3 months (around 9/18/2020) for lab review, further monitoring.      Jose Alfredo Chinchilla MD  Endocrinology

## 2020-06-18 NOTE — ASSESSMENT & PLAN NOTE
Reviewed goals of therapy - to get the best control we can without hypoglycemia. Goal <7   - last A1C increasing further  Medication changes:   Increase trulicity.   Try to take metformin consistently, at least once a day  Discussed with A1C increasing, likely need additional agent - recommend amaryl. Pt not interested at this time, wants to work harder on consistently taking metformin and also improve her diet. Discussed those lifestyle changes can be as effective as a medication, but often times take more work   - pt appears motivated to make changes, so will see how things go with a bit higher trulicity dose and her diet changes.   - repeat labs in a few months, revisit medication changes at that time if needed (though ideally dose increase plus diet could get A1C back down to about 7)  -Advised occasional self blood glucose monitoring. Patient encouraged to document glucose results and bring them to every clinic visit    -Close adherence to lifestyle changes recommended.    -Periodic follow ups for eye evaluations, foot care suggested.

## 2020-06-18 NOTE — PATIENT INSTRUCTIONS
For the diabetes:   Continue trulicity, increase to 1.5 mg/week (new prescription)   Try to take metformin once a day consistently (can be more gentle if taken after food)    Try to check the sugar a couple of times a week.    For the thyroid:   Decrease to 137/day.      Healthy Meals for Diabetes     A healthcare provider will help you develop a meal plan that fits your needs.   Ask your healthcare team to help you make a meal plan that fits your needs. Your meal plan tells you when to eat your meals and snacks, what kinds of foods to eat, and how much of each food to eat. You dont have to give up all the foods you like. But you do need to follow some guidelines.  Choose healthy carbohydrates  Starches, sugars, and fiber are all types of carbohydrates. Fiber can help lower your cholesterol and triglycerides. Fiber is also healthy for your heart. You should have 20 to 35 grams of total fiber each day. Fiber-rich foods include:  · Whole-grain breads and cereals  · Bulgur wheat  · Brown rice     · Whole-wheat pasta  · Fruits and vegetables  · Dry beans, and peas   Keep track of the amount of carbohydrates you eat. This can help you keep the right balance of physical activity and medicine. The amount of carbohydrates needed will vary for each person. It depends on many things such as your health, the medicines you take, and how active you are. Your healthcare team will help you figure out the right amount of carbohydrates for you. You may start with around 45 to 60 grams of carbohydrates per meal, depending on your situation.   Here are some examples of foods containing about 15 grams of carbohydrates (1 serving of carbohydrates):  · 1/2 cup of canned or frozen fruit  · A small piece of fresh fruit (4 ounces)  · 1 slice of bread  · 1/2 cup of oatmeal  · 1/3 cup of rice  · 4 to 6 crackers  · 1/2 English muffin  · 1/2 cup of black beans  · 1/4 of a large baked potato (3 ounces)  · 2/3 cup of plain fat-free yogurt  · 1  cup of soup  · 1/2 cup of casserole  · 6 chicken nuggets  · 2-inch-square brownie or cake without frosting  · 2 small cookies  · 1/2 cup of ice cream or sherbet  Choose healthy protein foods  Eating protein that is low in fat can help you control your weight. It also helps keep your heart healthy. Low-fat protein foods include:  · Fish  · Plant proteins, such as dry beans and peas, nuts, and soy products like tofu and soymilk  · Lean meat with all visible fat removed  · Poultry with the skin removed  · Low-fat or nonfat milk, cheese, and yogurt  Limit unhealthy fats and sugar  Saturated and trans fats are unhealthy for your heart. They raise LDL (bad) cholesterol. Fat is also high in calories, so it can make you gain weight. To cut down on unhealthy fats and sugar, limit these foods:  · Butter or margarine  · Palm and palm kernel oils and coconut oil  · Cream  · Cheese  · Stiles  · Lunch meats     · Ice cream  · Sweet bakery goods such as pies, muffins, and donuts  · Jams and jellies  · Candy bars  · Regular sodas   How much to eat  The amount of food you eat affects your blood sugar. It also affects your weight. Your healthcare team will tell you how much of each type of food you should eat.  · Use measuring cups and spoons and a food scale to measure serving sizes.  · Learn what a correct serving size looks like on your plate. This will help when you are away from home and cant measure your servings.  · Eat only the number of servings given on your meal plan for each food. Dont take seconds.  · Learn to read food labels. Be sure to look at serving size, total carbohydrates, fiber, calories, sugar, and saturated and trans fats. Look for healthier alternatives to foods that have added sugar.  · Plan ahead for parties so you can still have a good time without going overboard with unhealthy food choices. Set a good example yourself by bringing a healthy dish to pot lucks.   Choose healthy snacks  When it comes to  snacks, we usually think about foods with added sugar and fats. But there are many other options for healthier snack choices. Here are a few snack ideas to choose from:  Snacks with less than 5 grams of carbohydrates  · 1 piece of string cheese  · 3 celery sticks plus 1 tablespoon of peanut butter  · 5 cherry tomatoes plus 1 tablespoon of ranch dressing  · 1 hard-boiled egg  · 1/4 cup of fresh blueberries  ·  5 baby carrots  · 1 cup of light popcorn  · 1/2 cup of sugar-free gelatin  · 15 almonds  Snacks with about 10 to 20 grams of carbohydrates  · 1/3 cup of hummus plus 1 cup of fresh cut nonstarchy vegetables (carrots, green peppers, broccoli, celery, or a combination)  · 1/2 cup of fresh or canned fruit plus 1/4 cup of cottage cheese  · 1/2 cup of tuna salad with 4 crackers  · 2 rice cakes and a tablespoon of peanut butter  · 1 small apple or orange  · 3 cups light popcorn  · 1/2 of a turkey sandwich (1 slice of whole-wheat bread, 2 ounces of turkey, and mustard)  Portion sizes are important to controlling your blood sugar and staying at a healthy weight. Stock up on healthy snack items so you always have them on hand.  When to eat  Your meal plan will likely include breakfast, lunch, dinner, and some snacks.  · Try to eat your meals and snacks at about the same times each day.  · Eat all your meals and snacks. Skipping a meal or snack can make your blood sugar drop too low. It can also cause you to eat too much at the next meal or snack. Then your blood sugar could get too high.  Date Last Reviewed: 7/1/2016  © 8933-7542 [employer]. [address] [place] [address], [place] [address]. All rights reserved. This information is not intended as a substitute for professional medical care. Always follow your healthcare professional's instructions.

## 2020-06-18 NOTE — ASSESSMENT & PLAN NOTE
Last TSH low, free T4 normal   - clinically, not having many hyperthyroid symptoms   - decrease dose to 137   - recheck in 2-3 months or so

## 2020-10-04 ENCOUNTER — LAB VISIT (OUTPATIENT)
Dept: LAB | Facility: HOSPITAL | Age: 50
End: 2020-10-04
Attending: INTERNAL MEDICINE
Payer: COMMERCIAL

## 2020-10-04 DIAGNOSIS — E06.3 HASHIMOTO'S DISEASE: ICD-10-CM

## 2020-10-04 DIAGNOSIS — E11.65 TYPE 2 DIABETES MELLITUS WITH HYPERGLYCEMIA, WITHOUT LONG-TERM CURRENT USE OF INSULIN: ICD-10-CM

## 2020-10-04 LAB
ANION GAP SERPL CALC-SCNC: 13 MMOL/L (ref 8–16)
BUN SERPL-MCNC: 9 MG/DL (ref 6–20)
CALCIUM SERPL-MCNC: 9.8 MG/DL (ref 8.7–10.5)
CHLORIDE SERPL-SCNC: 105 MMOL/L (ref 95–110)
CO2 SERPL-SCNC: 25 MMOL/L (ref 23–29)
CREAT SERPL-MCNC: 0.8 MG/DL (ref 0.5–1.4)
EST. GFR  (AFRICAN AMERICAN): >60 ML/MIN/1.73 M^2
EST. GFR  (NON AFRICAN AMERICAN): >60 ML/MIN/1.73 M^2
ESTIMATED AVG GLUCOSE: 140 MG/DL (ref 68–131)
GLUCOSE SERPL-MCNC: 111 MG/DL (ref 70–110)
HBA1C MFR BLD HPLC: 6.5 % (ref 4–5.6)
POTASSIUM SERPL-SCNC: 3.5 MMOL/L (ref 3.5–5.1)
SODIUM SERPL-SCNC: 143 MMOL/L (ref 136–145)
T4 FREE SERPL-MCNC: 1.89 NG/DL (ref 0.71–1.51)
TSH SERPL DL<=0.005 MIU/L-ACNC: 0.04 UIU/ML (ref 0.4–4)

## 2020-10-04 PROCEDURE — 84443 ASSAY THYROID STIM HORMONE: CPT

## 2020-10-04 PROCEDURE — 36415 COLL VENOUS BLD VENIPUNCTURE: CPT

## 2020-10-04 PROCEDURE — 83036 HEMOGLOBIN GLYCOSYLATED A1C: CPT

## 2020-10-04 PROCEDURE — 84439 ASSAY OF FREE THYROXINE: CPT

## 2020-10-04 PROCEDURE — 80048 BASIC METABOLIC PNL TOTAL CA: CPT

## 2020-10-06 ENCOUNTER — OFFICE VISIT (OUTPATIENT)
Dept: ENDOCRINOLOGY | Facility: CLINIC | Age: 50
End: 2020-10-06
Payer: COMMERCIAL

## 2020-10-06 VITALS
BODY MASS INDEX: 23.77 KG/M2 | SYSTOLIC BLOOD PRESSURE: 124 MMHG | HEART RATE: 77 BPM | WEIGHT: 147.94 LBS | DIASTOLIC BLOOD PRESSURE: 80 MMHG | OXYGEN SATURATION: 98 % | HEIGHT: 66 IN | TEMPERATURE: 98 F

## 2020-10-06 DIAGNOSIS — E55.9 VITAMIN D INSUFFICIENCY: ICD-10-CM

## 2020-10-06 DIAGNOSIS — I10 ESSENTIAL HYPERTENSION: ICD-10-CM

## 2020-10-06 DIAGNOSIS — E11.65 TYPE 2 DIABETES MELLITUS WITH HYPERGLYCEMIA, WITHOUT LONG-TERM CURRENT USE OF INSULIN: Primary | ICD-10-CM

## 2020-10-06 DIAGNOSIS — E66.3 OVERWEIGHT (BMI 25.0-29.9): ICD-10-CM

## 2020-10-06 DIAGNOSIS — E06.3 HASHIMOTO'S DISEASE: ICD-10-CM

## 2020-10-06 DIAGNOSIS — E21.3 HYPERPARATHYROIDISM: ICD-10-CM

## 2020-10-06 DIAGNOSIS — E55.9 HYPOVITAMINOSIS D: ICD-10-CM

## 2020-10-06 PROCEDURE — 99999 PR PBB SHADOW E&M-EST. PATIENT-LVL IV: ICD-10-PCS | Mod: PBBFAC,,, | Performed by: INTERNAL MEDICINE

## 2020-10-06 PROCEDURE — 99999 PR PBB SHADOW E&M-EST. PATIENT-LVL IV: CPT | Mod: PBBFAC,,, | Performed by: INTERNAL MEDICINE

## 2020-10-06 PROCEDURE — 99214 OFFICE O/P EST MOD 30 MIN: CPT | Mod: S$GLB,,, | Performed by: INTERNAL MEDICINE

## 2020-10-06 PROCEDURE — 99214 PR OFFICE/OUTPT VISIT, EST, LEVL IV, 30-39 MIN: ICD-10-PCS | Mod: S$GLB,,, | Performed by: INTERNAL MEDICINE

## 2020-10-06 RX ORDER — BENAZEPRIL HYDROCHLORIDE 20 MG/1
20 TABLET ORAL DAILY
Qty: 90 TABLET | Refills: 3 | Status: SHIPPED | OUTPATIENT
Start: 2020-10-06

## 2020-10-06 RX ORDER — LEVOTHYROXINE SODIUM 125 UG/1
125 TABLET ORAL DAILY
Qty: 90 TABLET | Refills: 11 | Status: SHIPPED | OUTPATIENT
Start: 2020-10-06 | End: 2021-07-02 | Stop reason: ALTCHOICE

## 2020-10-06 NOTE — PROGRESS NOTES
Subjective:      Chief Complaint: Diabetes    HPI: Alis Costello is a 50 y.o. female who is here for a follow-up evaluation for diabetes. Last seen 6/18/2020    Pt also has hypothyroidism   On 137 mcg/day levothyroxine     Occasional missed doses.     Lab Results   Component Value Date    TSH 0.036 (L) 10/04/2020    [id number] 85 01/18/2019    FREET4 1.89 (H) 10/04/2020      Has HTN. On benazepril 40, HCTZ 25.   Reports some issues with low BP if taking as prescribed.  Lately has been taking medications every other day.    Had elevated PTH to 93 1/2019.   vit D 22. Takes vitamin D intermittently.   hx stones.    With regards to the diabetes:  Diagnosed with T2DM since around 2004     Known complications:     Retinopathy? No    Nephropathy? No    Neuropathy? Occasional/rare. Resolved with gabapentin    CAD? No    CVA? No     Current regimen:   Trulicity 1.5 mg/week   Metformin 1,000 mg BID    Missed doses? Yes, sometimes misses metformin.     Prior treatments:    metformin 1,000 mg BID -> GI side effects.    byetta   Saxagliptin   victoza -> switched to trulicity   glipizide -> gained weight    Self-Monitored blood glucose.  # times a day testing: few per week  Log reviewed: No    Pre breakfast: 100-140    Hypoglycemic events? None    Current symptoms:   some diarrhea.   insomnia   Fatigue   losing some weight (intentional)   polyuria  Pt denies:   polydipsia, vision changes, nausea and vomit  No palpitations. No heat intolerance. No tremor.    Diabetes Management Status    Statin: Taking  ACE/ARB: Taking    Screening or Prevention Patient's value Goal Complete/Controlled?   HgA1C Testing and Control   Lab Results   Component Value Date    HGBA1C 6.5 (H) 10/04/2020      q6months/Less than 7% Yes   Lipid profile : 06/11/2020 Annually Yes   LDL control Lab Results   Component Value Date    LDLCALC 92.2 06/11/2020    Annually/Less than 100 mg/dl  Yes   Nephropathy screening Lab Results   Component Value Date     MICALBCREAT 8.6 01/18/2019     Lab Results   Component Value Date    CREATININE 0.8 10/04/2020     Lab Results   Component Value Date    PROTEINUA Negative 01/18/2019    Annually No   Blood pressure BP Readings from Last 1 Encounters:   10/06/20 124/80    Less than 140/90 Yes   Dilated retinal exam : 08/13/2019 Annually No   Foot exam   Most Recent Foot Exam Date: Not Found Annually No     Reviewed past medical, family, social history and updated as appropriate.    Review of Systems   Constitutional: Negative for unexpected weight change (lost a few lbs).   HENT: Negative for trouble swallowing.    Eyes: Negative for visual disturbance.   Respiratory: Negative for shortness of breath.    Cardiovascular: Negative for palpitations.   Gastrointestinal: Positive for diarrhea (sometimes). Negative for constipation.   Endocrine: Positive for polyuria. Negative for polydipsia.   Genitourinary: Positive for frequency (at night, 2/night).   Neurological: Negative for dizziness.   Psychiatric/Behavioral: Positive for sleep disturbance.     Objective:     Vitals:    10/06/20 1408   BP: 124/80   Pulse: 77   Temp: 98.1 °F (36.7 °C)     BP Readings from Last 5 Encounters:   10/06/20 124/80   06/18/20 130/84   03/17/20 138/84   05/10/19 118/83   01/18/19 128/82     Physical Exam  Vitals signs reviewed.   Constitutional:       General: She is not in acute distress.  Neck:      Thyroid: No thyromegaly.   Cardiovascular:      Heart sounds: Normal heart sounds.   Pulmonary:      Effort: Pulmonary effort is normal.       Wt Readings from Last 10 Encounters:   10/06/20 1408 67.1 kg (147 lb 14.9 oz)   06/18/20 1142 72.1 kg (158 lb 15.2 oz)   03/17/20 1137 73.9 kg (163 lb 0.5 oz)   05/10/19 1320 66.7 kg (147 lb 0.8 oz)   01/18/19 0921 70.9 kg (156 lb 4.9 oz)   12/13/18 0858 69.2 kg (152 lb 8.9 oz)   11/14/18 0804 67.1 kg (148 lb)   11/05/18 1123 67.4 kg (148 lb 7.7 oz)   04/24/15 1609 76.7 kg (169 lb)   03/09/15 1353 75.8 kg (167 lb)        Lab Results   Component Value Date    HGBA1C 6.5 (H) 10/04/2020     Lab Results   Component Value Date    CHOL 169 06/11/2020    HDL 51 06/11/2020    LDLCALC 92.2 06/11/2020    TRIG 129 06/11/2020    CHOLHDL 30.2 06/11/2020     Lab Results   Component Value Date     10/04/2020    K 3.5 10/04/2020     10/04/2020    CO2 25 10/04/2020     (H) 10/04/2020    BUN 9 10/04/2020    CREATININE 0.8 10/04/2020    CALCIUM 9.8 10/04/2020    PROT 7.1 01/18/2019    ALBUMIN 4.0 01/18/2019    BILITOT 0.4 01/18/2019    ALKPHOS 83 01/18/2019    AST 24 01/18/2019    ALT 16 01/18/2019    ANIONGAP 13 10/04/2020    ESTGFRAFRICA >60 10/04/2020    EGFRNONAA >60 10/04/2020    TSH 0.036 (L) 10/04/2020      Lab Results   Component Value Date    MICALBCREAT 8.6 01/18/2019       Assessment/Plan:     Type 2 diabetes mellitus with hyperglycemia  Last A1C excellent, at/below goal, remarkable improvement without much change in regimen (just increased trulicity to full dose). Most of this improvement is due to patient's changes to diet, exercise, taking medications more consistently. Encourage pt to keep it up   - no hypoglycemia   - continue GLP1, metformin, same doses   - check sugar on occasion    - keep f/u with eye/foot exams      Hashimoto's disease  Last TSH still low, free T4 normal   - clinically, not really having many hyperthyroid symptoms   - decrease dose further to 125 mcg/day   - recheck in 3 months      Overweight (BMI 25.0-29.9)  Pt BMI was over 25   - lost weight   - normal now   - encourage pt to keep up her efforts on healthy diet, exercise habits   - monitor weight    Hyperparathyroidism  Had elevated PTH in 2019   - with low vit D   - treat vit D as below   - recheck PTH with next labs, further investigation if needed    Hypovitaminosis D  As above. Take vit D 2,000 units/day OTC   - repeat with next labs, may need more    Essential hypertension  On benazepril, HCTZ   - having some low BP issues on  occasion   - BP normal today   - decrease benazepril to 20 mg/day   - continue HCTZ (reports issues with stones in the past, if calcium containing and using HCTZ to decrease calcium excretion don't want to stop that yet)        Follow up in about 3 months (around 1/6/2021) for lab review, further monitoring.      Jose Alfredo Chinchilla MD  Endocrinology

## 2020-10-06 NOTE — PATIENT INSTRUCTIONS
For diabeteS: continue trulicity   decrease metformin to 1,000 mg/day to see if that helps diarrhea    For thyroid, decrease to 125 micrograms per day.   - if you have a lot of 137 microgram tablets, you can continue that but one day per week take half a tablet.    For vitamin D: try to take 2,000 units/day over the counter. Important for bone healthy.

## 2020-10-07 PROBLEM — E21.3 HYPERPARATHYROIDISM: Status: ACTIVE | Noted: 2020-10-07

## 2020-10-07 NOTE — ASSESSMENT & PLAN NOTE
Last A1C excellent, at/below goal, remarkable improvement without much change in regimen (just increased trulicity to full dose). Most of this improvement is due to patient's changes to diet, exercise, taking medications more consistently. Encourage pt to keep it up   - no hypoglycemia   - continue GLP1, metformin, same doses   - check sugar on occasion    - keep f/u with eye/foot exams

## 2020-10-07 NOTE — ASSESSMENT & PLAN NOTE
On benazepril, HCTZ   - having some low BP issues on occasion   - BP normal today   - decrease benazepril to 20 mg/day   - continue HCTZ (reports issues with stones in the past, if calcium containing and using HCTZ to decrease calcium excretion don't want to stop that yet)

## 2020-10-07 NOTE — ASSESSMENT & PLAN NOTE
Pt BMI was over 25   - lost weight   - normal now   - encourage pt to keep up her efforts on healthy diet, exercise habits   - monitor weight

## 2020-10-07 NOTE — ASSESSMENT & PLAN NOTE
Had elevated PTH in 2019   - with low vit D   - treat vit D as below   - recheck PTH with next labs, further investigation if needed

## 2020-10-07 NOTE — ASSESSMENT & PLAN NOTE
Last TSH still low, free T4 normal   - clinically, not really having many hyperthyroid symptoms   - decrease dose further to 125 mcg/day   - recheck in 3 months

## 2021-01-08 ENCOUNTER — LAB VISIT (OUTPATIENT)
Dept: LAB | Facility: HOSPITAL | Age: 51
End: 2021-01-08
Attending: INTERNAL MEDICINE
Payer: COMMERCIAL

## 2021-01-08 DIAGNOSIS — E55.9 VITAMIN D INSUFFICIENCY: ICD-10-CM

## 2021-01-08 DIAGNOSIS — E21.3 HYPERPARATHYROIDISM: ICD-10-CM

## 2021-01-08 DIAGNOSIS — E11.65 TYPE 2 DIABETES MELLITUS WITH HYPERGLYCEMIA, WITHOUT LONG-TERM CURRENT USE OF INSULIN: ICD-10-CM

## 2021-01-08 DIAGNOSIS — E06.3 HASHIMOTO'S DISEASE: ICD-10-CM

## 2021-01-08 PROCEDURE — 84439 ASSAY OF FREE THYROXINE: CPT

## 2021-01-08 PROCEDURE — 83970 ASSAY OF PARATHORMONE: CPT

## 2021-01-08 PROCEDURE — 82306 VITAMIN D 25 HYDROXY: CPT

## 2021-01-08 PROCEDURE — 83036 HEMOGLOBIN GLYCOSYLATED A1C: CPT

## 2021-01-08 PROCEDURE — 84443 ASSAY THYROID STIM HORMONE: CPT

## 2021-01-08 PROCEDURE — 80053 COMPREHEN METABOLIC PANEL: CPT

## 2021-01-08 PROCEDURE — 36415 COLL VENOUS BLD VENIPUNCTURE: CPT | Mod: PO

## 2021-01-09 LAB
25(OH)D3+25(OH)D2 SERPL-MCNC: 25 NG/ML (ref 30–96)
ALBUMIN SERPL BCP-MCNC: 4.2 G/DL (ref 3.5–5.2)
ALP SERPL-CCNC: 100 U/L (ref 55–135)
ALT SERPL W/O P-5'-P-CCNC: 39 U/L (ref 10–44)
ANION GAP SERPL CALC-SCNC: 9 MMOL/L (ref 8–16)
AST SERPL-CCNC: 34 U/L (ref 10–40)
BILIRUB SERPL-MCNC: 0.4 MG/DL (ref 0.1–1)
BUN SERPL-MCNC: 8 MG/DL (ref 6–20)
CALCIUM SERPL-MCNC: 9.7 MG/DL (ref 8.7–10.5)
CHLORIDE SERPL-SCNC: 105 MMOL/L (ref 95–110)
CO2 SERPL-SCNC: 27 MMOL/L (ref 23–29)
CREAT SERPL-MCNC: 0.8 MG/DL (ref 0.5–1.4)
EST. GFR  (AFRICAN AMERICAN): >60 ML/MIN/1.73 M^2
EST. GFR  (NON AFRICAN AMERICAN): >60 ML/MIN/1.73 M^2
ESTIMATED AVG GLUCOSE: 140 MG/DL (ref 68–131)
GLUCOSE SERPL-MCNC: 165 MG/DL (ref 70–110)
HBA1C MFR BLD HPLC: 6.5 % (ref 4–5.6)
POTASSIUM SERPL-SCNC: 3.7 MMOL/L (ref 3.5–5.1)
PROT SERPL-MCNC: 7.4 G/DL (ref 6–8.4)
PTH-INTACT SERPL-MCNC: 96 PG/ML (ref 9–77)
SODIUM SERPL-SCNC: 141 MMOL/L (ref 136–145)
T4 FREE SERPL-MCNC: 1.03 NG/DL (ref 0.71–1.51)
TSH SERPL DL<=0.005 MIU/L-ACNC: 1.55 UIU/ML (ref 0.4–4)

## 2021-01-14 ENCOUNTER — OFFICE VISIT (OUTPATIENT)
Dept: ENDOCRINOLOGY | Facility: CLINIC | Age: 51
End: 2021-01-14
Payer: COMMERCIAL

## 2021-01-14 VITALS
HEART RATE: 68 BPM | TEMPERATURE: 97 F | SYSTOLIC BLOOD PRESSURE: 130 MMHG | BODY MASS INDEX: 24.2 KG/M2 | HEIGHT: 66 IN | WEIGHT: 150.56 LBS | DIASTOLIC BLOOD PRESSURE: 80 MMHG

## 2021-01-14 DIAGNOSIS — E21.3 HYPERPARATHYROIDISM: ICD-10-CM

## 2021-01-14 DIAGNOSIS — E11.65 TYPE 2 DIABETES MELLITUS WITH HYPERGLYCEMIA, WITHOUT LONG-TERM CURRENT USE OF INSULIN: Primary | ICD-10-CM

## 2021-01-14 DIAGNOSIS — E06.3 HASHIMOTO'S DISEASE: ICD-10-CM

## 2021-01-14 DIAGNOSIS — E55.9 HYPOVITAMINOSIS D: ICD-10-CM

## 2021-01-14 DIAGNOSIS — E55.9 VITAMIN D INSUFFICIENCY: ICD-10-CM

## 2021-01-14 PROCEDURE — 3008F PR BODY MASS INDEX (BMI) DOCUMENTED: ICD-10-PCS | Mod: CPTII,S$GLB,, | Performed by: INTERNAL MEDICINE

## 2021-01-14 PROCEDURE — 99214 OFFICE O/P EST MOD 30 MIN: CPT | Mod: S$GLB,,, | Performed by: INTERNAL MEDICINE

## 2021-01-14 PROCEDURE — 1126F PR PAIN SEVERITY QUANTIFIED, NO PAIN PRESENT: ICD-10-PCS | Mod: S$GLB,,, | Performed by: INTERNAL MEDICINE

## 2021-01-14 PROCEDURE — 1126F AMNT PAIN NOTED NONE PRSNT: CPT | Mod: S$GLB,,, | Performed by: INTERNAL MEDICINE

## 2021-01-14 PROCEDURE — 99999 PR PBB SHADOW E&M-EST. PATIENT-LVL III: ICD-10-PCS | Mod: PBBFAC,,, | Performed by: INTERNAL MEDICINE

## 2021-01-14 PROCEDURE — 99999 PR PBB SHADOW E&M-EST. PATIENT-LVL III: CPT | Mod: PBBFAC,,, | Performed by: INTERNAL MEDICINE

## 2021-01-14 PROCEDURE — 99214 PR OFFICE/OUTPT VISIT, EST, LEVL IV, 30-39 MIN: ICD-10-PCS | Mod: S$GLB,,, | Performed by: INTERNAL MEDICINE

## 2021-01-14 PROCEDURE — 3008F BODY MASS INDEX DOCD: CPT | Mod: CPTII,S$GLB,, | Performed by: INTERNAL MEDICINE

## 2021-01-14 RX ORDER — ERGOCALCIFEROL 1.25 MG/1
50000 CAPSULE ORAL
Qty: 12 CAPSULE | Refills: 3 | Status: SHIPPED | OUTPATIENT
Start: 2021-01-14

## 2021-07-02 DIAGNOSIS — E06.3 HASHIMOTO'S DISEASE: ICD-10-CM

## 2021-07-02 RX ORDER — LEVOTHYROXINE SODIUM 137 UG/1
TABLET ORAL
Qty: 90 TABLET | Refills: 3 | Status: SHIPPED | OUTPATIENT
Start: 2021-07-02 | End: 2022-08-02

## 2021-11-09 ENCOUNTER — TELEPHONE (OUTPATIENT)
Dept: ENDOCRINOLOGY | Facility: CLINIC | Age: 51
End: 2021-11-09
Payer: COMMERCIAL

## 2021-11-18 ENCOUNTER — PATIENT MESSAGE (OUTPATIENT)
Dept: ENDOCRINOLOGY | Facility: CLINIC | Age: 51
End: 2021-11-18
Payer: COMMERCIAL

## 2021-11-19 ENCOUNTER — TELEPHONE (OUTPATIENT)
Dept: ENDOCRINOLOGY | Facility: CLINIC | Age: 51
End: 2021-11-19
Payer: COMMERCIAL

## 2021-11-22 ENCOUNTER — OFFICE VISIT (OUTPATIENT)
Dept: ENDOCRINOLOGY | Facility: CLINIC | Age: 51
End: 2021-11-22
Payer: COMMERCIAL

## 2021-11-22 ENCOUNTER — LAB VISIT (OUTPATIENT)
Dept: LAB | Facility: HOSPITAL | Age: 51
End: 2021-11-22
Attending: INTERNAL MEDICINE
Payer: COMMERCIAL

## 2021-11-22 VITALS
HEIGHT: 66 IN | WEIGHT: 158.63 LBS | BODY MASS INDEX: 25.49 KG/M2 | SYSTOLIC BLOOD PRESSURE: 136 MMHG | DIASTOLIC BLOOD PRESSURE: 86 MMHG | OXYGEN SATURATION: 98 % | TEMPERATURE: 99 F | HEART RATE: 79 BPM

## 2021-11-22 DIAGNOSIS — E21.3 HYPERPARATHYROIDISM: ICD-10-CM

## 2021-11-22 DIAGNOSIS — N95.1 PERIMENOPAUSE: ICD-10-CM

## 2021-11-22 DIAGNOSIS — E11.65 TYPE 2 DIABETES MELLITUS WITH HYPERGLYCEMIA, WITHOUT LONG-TERM CURRENT USE OF INSULIN: Primary | ICD-10-CM

## 2021-11-22 DIAGNOSIS — E06.3 HASHIMOTO'S DISEASE: ICD-10-CM

## 2021-11-22 DIAGNOSIS — E11.65 TYPE 2 DIABETES MELLITUS WITH HYPERGLYCEMIA, WITHOUT LONG-TERM CURRENT USE OF INSULIN: ICD-10-CM

## 2021-11-22 DIAGNOSIS — E55.9 HYPOVITAMINOSIS D: ICD-10-CM

## 2021-11-22 LAB
ALBUMIN SERPL BCP-MCNC: 4 G/DL (ref 3.5–5.2)
ALP SERPL-CCNC: 87 U/L (ref 55–135)
ALT SERPL W/O P-5'-P-CCNC: 20 U/L (ref 10–44)
ANION GAP SERPL CALC-SCNC: 11 MMOL/L (ref 8–16)
AST SERPL-CCNC: 20 U/L (ref 10–40)
BILIRUB SERPL-MCNC: 0.4 MG/DL (ref 0.1–1)
BUN SERPL-MCNC: 12 MG/DL (ref 6–20)
CALCIUM SERPL-MCNC: 9.8 MG/DL (ref 8.7–10.5)
CHLORIDE SERPL-SCNC: 105 MMOL/L (ref 95–110)
CHOLEST SERPL-MCNC: 180 MG/DL (ref 120–199)
CHOLEST/HDLC SERPL: 3.8 {RATIO} (ref 2–5)
CO2 SERPL-SCNC: 26 MMOL/L (ref 23–29)
CREAT SERPL-MCNC: 0.8 MG/DL (ref 0.5–1.4)
EST. GFR  (AFRICAN AMERICAN): >60 ML/MIN/1.73 M^2
EST. GFR  (NON AFRICAN AMERICAN): >60 ML/MIN/1.73 M^2
ESTIMATED AVG GLUCOSE: 157 MG/DL (ref 68–131)
GLUCOSE SERPL-MCNC: 141 MG/DL (ref 70–110)
HBA1C MFR BLD: 7.1 % (ref 4–5.6)
HDLC SERPL-MCNC: 48 MG/DL (ref 40–75)
HDLC SERPL: 26.7 % (ref 20–50)
LDLC SERPL CALC-MCNC: 108.4 MG/DL (ref 63–159)
NONHDLC SERPL-MCNC: 132 MG/DL
POTASSIUM SERPL-SCNC: 3.7 MMOL/L (ref 3.5–5.1)
PROT SERPL-MCNC: 6.8 G/DL (ref 6–8.4)
PTH-INTACT SERPL-MCNC: 115.7 PG/ML (ref 9–77)
SODIUM SERPL-SCNC: 142 MMOL/L (ref 136–145)
T4 FREE SERPL-MCNC: 0.8 NG/DL (ref 0.71–1.51)
TRIGL SERPL-MCNC: 118 MG/DL (ref 30–150)
TSH SERPL DL<=0.005 MIU/L-ACNC: 13.21 UIU/ML (ref 0.4–4)

## 2021-11-22 PROCEDURE — 99214 OFFICE O/P EST MOD 30 MIN: CPT | Mod: S$GLB,,, | Performed by: PHYSICIAN ASSISTANT

## 2021-11-22 PROCEDURE — 83036 HEMOGLOBIN GLYCOSYLATED A1C: CPT | Performed by: INTERNAL MEDICINE

## 2021-11-22 PROCEDURE — 83001 ASSAY OF GONADOTROPIN (FSH): CPT | Performed by: PHYSICIAN ASSISTANT

## 2021-11-22 PROCEDURE — 99999 PR PBB SHADOW E&M-EST. PATIENT-LVL III: CPT | Mod: PBBFAC,,, | Performed by: PHYSICIAN ASSISTANT

## 2021-11-22 PROCEDURE — 84443 ASSAY THYROID STIM HORMONE: CPT | Performed by: INTERNAL MEDICINE

## 2021-11-22 PROCEDURE — 80053 COMPREHEN METABOLIC PANEL: CPT | Performed by: INTERNAL MEDICINE

## 2021-11-22 PROCEDURE — 99214 PR OFFICE/OUTPT VISIT, EST, LEVL IV, 30-39 MIN: ICD-10-PCS | Mod: S$GLB,,, | Performed by: PHYSICIAN ASSISTANT

## 2021-11-22 PROCEDURE — 4010F ACE/ARB THERAPY RXD/TAKEN: CPT | Mod: CPTII,S$GLB,, | Performed by: PHYSICIAN ASSISTANT

## 2021-11-22 PROCEDURE — 83002 ASSAY OF GONADOTROPIN (LH): CPT | Performed by: PHYSICIAN ASSISTANT

## 2021-11-22 PROCEDURE — 36415 COLL VENOUS BLD VENIPUNCTURE: CPT | Mod: PO | Performed by: INTERNAL MEDICINE

## 2021-11-22 PROCEDURE — 99999 PR PBB SHADOW E&M-EST. PATIENT-LVL III: ICD-10-PCS | Mod: PBBFAC,,, | Performed by: PHYSICIAN ASSISTANT

## 2021-11-22 PROCEDURE — 80061 LIPID PANEL: CPT | Performed by: PHYSICIAN ASSISTANT

## 2021-11-22 PROCEDURE — 83970 ASSAY OF PARATHORMONE: CPT | Performed by: INTERNAL MEDICINE

## 2021-11-22 PROCEDURE — 4010F PR ACE/ARB THEARPY RXD/TAKEN: ICD-10-PCS | Mod: CPTII,S$GLB,, | Performed by: PHYSICIAN ASSISTANT

## 2021-11-22 PROCEDURE — 84439 ASSAY OF FREE THYROXINE: CPT | Performed by: INTERNAL MEDICINE

## 2021-11-22 RX ORDER — METFORMIN HYDROCHLORIDE 500 MG/1
1000 TABLET, EXTENDED RELEASE ORAL 2 TIMES DAILY WITH MEALS
Qty: 360 TABLET | Refills: 3 | Status: SHIPPED | OUTPATIENT
Start: 2021-11-22 | End: 2022-11-22

## 2021-11-22 RX ORDER — BLOOD-GLUCOSE,RECEIVER,CONT
EACH MISCELLANEOUS
Qty: 1 EACH | Refills: 0 | Status: SHIPPED | OUTPATIENT
Start: 2021-11-22

## 2021-11-22 RX ORDER — DULAGLUTIDE 3 MG/.5ML
3 INJECTION, SOLUTION SUBCUTANEOUS
Qty: 4 PEN | Refills: 11 | Status: SHIPPED | OUTPATIENT
Start: 2021-11-22 | End: 2022-12-19

## 2021-11-22 RX ORDER — FLASH GLUCOSE SENSOR
KIT MISCELLANEOUS
Qty: 2 KIT | Refills: 11 | Status: SHIPPED | OUTPATIENT
Start: 2021-11-22 | End: 2023-06-20 | Stop reason: SDUPTHER

## 2021-11-23 LAB
FSH SERPL-ACNC: 67.15 MIU/ML
LH SERPL-ACNC: 21.9 MIU/ML

## 2021-12-02 ENCOUNTER — TELEPHONE (OUTPATIENT)
Dept: ENDOCRINOLOGY | Facility: CLINIC | Age: 51
End: 2021-12-02
Payer: COMMERCIAL

## 2022-01-25 DIAGNOSIS — E21.3 HYPERPARATHYROIDISM: ICD-10-CM

## 2022-01-25 DIAGNOSIS — E55.9 VITAMIN D INSUFFICIENCY: ICD-10-CM

## 2022-01-25 RX ORDER — ERGOCALCIFEROL 1.25 MG/1
CAPSULE ORAL
Qty: 12 CAPSULE | Refills: 3 | OUTPATIENT
Start: 2022-01-25

## 2022-01-25 NOTE — TELEPHONE ENCOUNTER
Last labs:    Lab Results   Component Value Date    CALCIUM 9.8 11/22/2021    ALBUMIN 4.0 11/22/2021    CREATININE 0.8 11/22/2021    [id number] 25 (L) 01/08/2021    .7 (H) 11/22/2021       Had been on 50,000 units/week for 1 year.   can take 2,000 units/day over the counter.

## 2022-04-15 NOTE — PROGRESS NOTES
I have reviewed the lab results of the labs that the patient had done at [facility] from 08/09/19;  HBA1c; 7.5, folate >20, Vit B12; 506 (n) free T4; 1.23 (n), TSH; 4.15 (0.27- 4.2)  Lipid panel; total Bijal; 212, Trig; 83, LDL; 136 HDL; 59,    DISCHARGE

## 2022-09-27 ENCOUNTER — PATIENT MESSAGE (OUTPATIENT)
Dept: ENDOCRINOLOGY | Facility: CLINIC | Age: 52
End: 2022-09-27
Payer: COMMERCIAL

## 2022-11-04 ENCOUNTER — PATIENT MESSAGE (OUTPATIENT)
Dept: ENDOCRINOLOGY | Facility: CLINIC | Age: 52
End: 2022-11-04

## 2022-11-04 ENCOUNTER — TELEPHONE (OUTPATIENT)
Dept: ENDOCRINOLOGY | Facility: CLINIC | Age: 52
End: 2022-11-04
Payer: COMMERCIAL

## 2023-02-07 ENCOUNTER — PATIENT MESSAGE (OUTPATIENT)
Dept: ENDOCRINOLOGY | Facility: CLINIC | Age: 53
End: 2023-02-07
Payer: COMMERCIAL

## 2023-03-02 ENCOUNTER — LAB VISIT (OUTPATIENT)
Dept: LAB | Facility: HOSPITAL | Age: 53
End: 2023-03-02
Attending: INTERNAL MEDICINE
Payer: COMMERCIAL

## 2023-03-02 ENCOUNTER — OFFICE VISIT (OUTPATIENT)
Dept: ENDOCRINOLOGY | Facility: CLINIC | Age: 53
End: 2023-03-02
Payer: COMMERCIAL

## 2023-03-02 VITALS
HEIGHT: 66 IN | BODY MASS INDEX: 23.7 KG/M2 | RESPIRATION RATE: 14 BRPM | DIASTOLIC BLOOD PRESSURE: 68 MMHG | TEMPERATURE: 98 F | HEART RATE: 100 BPM | OXYGEN SATURATION: 97 % | WEIGHT: 147.5 LBS | SYSTOLIC BLOOD PRESSURE: 120 MMHG

## 2023-03-02 DIAGNOSIS — E55.9 HYPOVITAMINOSIS D: ICD-10-CM

## 2023-03-02 DIAGNOSIS — E06.3 HASHIMOTO'S DISEASE: ICD-10-CM

## 2023-03-02 DIAGNOSIS — E21.3 HYPERPARATHYROIDISM: ICD-10-CM

## 2023-03-02 DIAGNOSIS — E11.65 TYPE 2 DIABETES MELLITUS WITH HYPERGLYCEMIA, WITHOUT LONG-TERM CURRENT USE OF INSULIN: Primary | ICD-10-CM

## 2023-03-02 DIAGNOSIS — E11.65 TYPE 2 DIABETES MELLITUS WITH HYPERGLYCEMIA, WITHOUT LONG-TERM CURRENT USE OF INSULIN: ICD-10-CM

## 2023-03-02 PROCEDURE — 36415 COLL VENOUS BLD VENIPUNCTURE: CPT | Mod: PO | Performed by: INTERNAL MEDICINE

## 2023-03-02 PROCEDURE — 84443 ASSAY THYROID STIM HORMONE: CPT | Performed by: INTERNAL MEDICINE

## 2023-03-02 PROCEDURE — 3008F BODY MASS INDEX DOCD: CPT | Mod: CPTII,S$GLB,, | Performed by: INTERNAL MEDICINE

## 2023-03-02 PROCEDURE — 99999 PR PBB SHADOW E&M-EST. PATIENT-LVL IV: ICD-10-PCS | Mod: PBBFAC,,, | Performed by: INTERNAL MEDICINE

## 2023-03-02 PROCEDURE — 99214 PR OFFICE/OUTPT VISIT, EST, LEVL IV, 30-39 MIN: ICD-10-PCS | Mod: S$GLB,,, | Performed by: INTERNAL MEDICINE

## 2023-03-02 PROCEDURE — 1160F RVW MEDS BY RX/DR IN RCRD: CPT | Mod: CPTII,S$GLB,, | Performed by: INTERNAL MEDICINE

## 2023-03-02 PROCEDURE — 3074F PR MOST RECENT SYSTOLIC BLOOD PRESSURE < 130 MM HG: ICD-10-PCS | Mod: CPTII,S$GLB,, | Performed by: INTERNAL MEDICINE

## 2023-03-02 PROCEDURE — 3078F DIAST BP <80 MM HG: CPT | Mod: CPTII,S$GLB,, | Performed by: INTERNAL MEDICINE

## 2023-03-02 PROCEDURE — 80061 LIPID PANEL: CPT | Performed by: INTERNAL MEDICINE

## 2023-03-02 PROCEDURE — 4010F ACE/ARB THERAPY RXD/TAKEN: CPT | Mod: CPTII,S$GLB,, | Performed by: INTERNAL MEDICINE

## 2023-03-02 PROCEDURE — 99214 OFFICE O/P EST MOD 30 MIN: CPT | Mod: S$GLB,,, | Performed by: INTERNAL MEDICINE

## 2023-03-02 PROCEDURE — 1160F PR REVIEW ALL MEDS BY PRESCRIBER/CLIN PHARMACIST DOCUMENTED: ICD-10-PCS | Mod: CPTII,S$GLB,, | Performed by: INTERNAL MEDICINE

## 2023-03-02 PROCEDURE — 3074F SYST BP LT 130 MM HG: CPT | Mod: CPTII,S$GLB,, | Performed by: INTERNAL MEDICINE

## 2023-03-02 PROCEDURE — 80053 COMPREHEN METABOLIC PANEL: CPT | Performed by: INTERNAL MEDICINE

## 2023-03-02 PROCEDURE — 99999 PR PBB SHADOW E&M-EST. PATIENT-LVL IV: CPT | Mod: PBBFAC,,, | Performed by: INTERNAL MEDICINE

## 2023-03-02 PROCEDURE — 3078F PR MOST RECENT DIASTOLIC BLOOD PRESSURE < 80 MM HG: ICD-10-PCS | Mod: CPTII,S$GLB,, | Performed by: INTERNAL MEDICINE

## 2023-03-02 PROCEDURE — 1159F MED LIST DOCD IN RCRD: CPT | Mod: CPTII,S$GLB,, | Performed by: INTERNAL MEDICINE

## 2023-03-02 PROCEDURE — 1159F PR MEDICATION LIST DOCUMENTED IN MEDICAL RECORD: ICD-10-PCS | Mod: CPTII,S$GLB,, | Performed by: INTERNAL MEDICINE

## 2023-03-02 PROCEDURE — 82306 VITAMIN D 25 HYDROXY: CPT | Performed by: INTERNAL MEDICINE

## 2023-03-02 PROCEDURE — 84439 ASSAY OF FREE THYROXINE: CPT | Performed by: INTERNAL MEDICINE

## 2023-03-02 PROCEDURE — 83970 ASSAY OF PARATHORMONE: CPT | Performed by: INTERNAL MEDICINE

## 2023-03-02 PROCEDURE — 4010F PR ACE/ARB THEARPY RXD/TAKEN: ICD-10-PCS | Mod: CPTII,S$GLB,, | Performed by: INTERNAL MEDICINE

## 2023-03-02 PROCEDURE — 3008F PR BODY MASS INDEX (BMI) DOCUMENTED: ICD-10-PCS | Mod: CPTII,S$GLB,, | Performed by: INTERNAL MEDICINE

## 2023-03-02 PROCEDURE — 83036 HEMOGLOBIN GLYCOSYLATED A1C: CPT | Performed by: INTERNAL MEDICINE

## 2023-03-02 RX ORDER — DULAGLUTIDE 3 MG/.5ML
3 INJECTION, SOLUTION SUBCUTANEOUS
Qty: 12 PEN | Refills: 0 | Status: CANCELLED | OUTPATIENT
Start: 2023-03-02 | End: 2024-03-01

## 2023-03-02 NOTE — ASSESSMENT & PLAN NOTE
Had elevated PTH on last labs   - with low vit D   - re-evaluate when able.   If PTH still high with normal vitamin D, try for 24 hr urine, DXA evaluation, etc   Otherwise, restart vitamin D prescription and monitor

## 2023-03-02 NOTE — PROGRESS NOTES
Subjective:      Chief Complaint: Follow-up (F/U Diabetes) and Diabetes    HPI: Alis Costello is a 53 y.o. female who is here for a follow-up evaluation for diabetes. Last seen 1/14/2021. Had recommended f/u later in 2021, not done    Pt also has hypothyroidism   On 137 mcg/day levothyroxine     Lab Results   Component Value Date    TSH 13.213 (H) 11/22/2021    [id number] 85 01/18/2019    FREET4 0.80 11/22/2021     Had elevated PTH to 93 1/2019.   vit D low. Not taking lately   +hx stones. None lately    Last labs:    Lab Results   Component Value Date    CALCIUM 9.8 11/22/2021    ALBUMIN 4.0 11/22/2021    CREATININE 0.8 11/22/2021    [id number] 25 (L) 01/08/2021    .7 (H) 11/22/2021     With regards to the diabetes:  Diagnosed with T2DM since around 2004    Known complications:     Retinopathy? No    Nephropathy? No    Neuropathy? Occasional/rare. Resolved with gabapentin    CAD? No    CVA? No     Current regimen:   Trulicity 3 mg/week    Missed doses? out of trulicity the last month or so     Prior treatments:    metformin 1,000 mg BID -> GI side effects.    byetta   Saxagliptin   victoza -> switched to trulicity   glipizide -> gained weight   Metformin 1,000 mg daily    Self-Monitored blood glucose.  # times a day testing: occasional  Log reviewed: No    120-160s.    Hypoglycemic events? None lately    Current symptoms:   feeling okay overall.     +fatigue    No heat/cold intolerance  Weight stable  Not much diarrhea/constipation    No kidney stones  No fractures, no falls    Diabetes Management Status    Statin: Taking  ACE/ARB: Taking    Screening or Prevention Patient's value Goal Complete/Controlled?   HgA1C Testing and Control   Lab Results   Component Value Date    HGBA1C 7.1 (H) 11/22/2021      q6months/Less than 7% no   Lipid profile : 11/22/2021 Annually no   LDL control Lab Results   Component Value Date    LDLCALC 108.4 11/22/2021    Annually/Less than 100 mg/dl  no   Nephropathy  screening Lab Results   Component Value Date    MICALBCREAT 8.6 01/18/2019     Lab Results   Component Value Date    CREATININE 0.8 11/22/2021     Lab Results   Component Value Date    PROTEINUA Negative 01/18/2019    Annually No   Blood pressure BP Readings from Last 1 Encounters:   11/22/21 136/86    Less than 140/90 Yes   Dilated retinal exam Most Recent Eye Exam Date: Not Found Annually No   Foot exam   Most Recent Foot Exam Date: Not Found Annually No     Reviewed past medical, family, social history and updated as appropriate.    Review of Systems   As above    Objective:     Vitals:    03/02/23 1529   BP: 120/68   Pulse: 100   Resp: 14   Temp: 98 °F (36.7 °C)       BP Readings from Last 5 Encounters:   11/22/21 136/86   01/14/21 130/80   10/06/20 124/80   06/18/20 130/84   03/17/20 138/84     Physical Exam  Vitals reviewed.   Constitutional:       General: She is not in acute distress.  Cardiovascular:      Heart sounds: Normal heart sounds.   Pulmonary:      Effort: Pulmonary effort is normal.     Wt Readings from Last 10 Encounters:   03/02/23 1529 66.9 kg (147 lb 7.8 oz)   11/22/21 1111 72 kg (158 lb 9.9 oz)   01/14/21 1606 68.3 kg (150 lb 9.2 oz)   10/06/20 1408 67.1 kg (147 lb 14.9 oz)   06/18/20 1142 72.1 kg (158 lb 15.2 oz)   03/17/20 1137 73.9 kg (163 lb 0.5 oz)   05/10/19 1320 66.7 kg (147 lb 0.8 oz)   01/18/19 0921 70.9 kg (156 lb 4.9 oz)   12/13/18 0858 69.2 kg (152 lb 8.9 oz)   11/14/18 0804 67.1 kg (148 lb)       Lab Results   Component Value Date    HGBA1C 7.1 (H) 11/22/2021     Lab Results   Component Value Date    CHOL 180 11/22/2021    HDL 48 11/22/2021    LDLCALC 108.4 11/22/2021    TRIG 118 11/22/2021    CHOLHDL 26.7 11/22/2021     Lab Results   Component Value Date     11/22/2021    K 3.7 11/22/2021     11/22/2021    CO2 26 11/22/2021     (H) 11/22/2021    BUN 12 11/22/2021    CREATININE 0.8 11/22/2021    CALCIUM 9.8 11/22/2021    PROT 6.8 11/22/2021    ALBUMIN 4.0  11/22/2021    BILITOT 0.4 11/22/2021    ALKPHOS 87 11/22/2021    AST 20 11/22/2021    ALT 20 11/22/2021    ANIONGAP 11 11/22/2021    ESTGFRAFRICA >60.0 11/22/2021    EGFRNONAA >60.0 11/22/2021    TSH 13.213 (H) 11/22/2021      Lab Results   Component Value Date    MICALBCREAT 8.6 01/18/2019       Assessment/Plan:     Type 2 diabetes mellitus with hyperglycemia  Last A1C excellent, at/below goal, stable.   - no hypoglycemia   been a while   recheck labs when able   out of GLP1 for a few weeks   restart when able. Consider lower dose/alternative agent if needed  - check sugar on occasion    - keep f/u with eye/foot exams      Hashimoto's disease  No recent labs   clinically denies most symptoms, though has a few potential ones   continue same dose for now, 137 mcg/day.   consider dose adjustment based on results   goal is a normal TSH.      Hyperparathyroidism  Had elevated PTH on last labs   - with low vit D   - re-evaluate when able.   If PTH still high with normal vitamin D, try for 24 hr urine, DXA evaluation, etc   Otherwise, restart vitamin D prescription and monitor      Hypovitaminosis D  Recheck with labs      Follow up in about 1 year (around 3/2/2024) for further monitoring, lab review.      Jose Alfredo Chinchilla MD  Endocrinology

## 2023-03-02 NOTE — ASSESSMENT & PLAN NOTE
Last A1C excellent, at/below goal, stable.   - no hypoglycemia   been a while   recheck labs when able   out of GLP1 for a few weeks   restart when able. Consider lower dose/alternative agent if needed  - check sugar on occasion    - keep f/u with eye/foot exams

## 2023-03-02 NOTE — ASSESSMENT & PLAN NOTE
No recent labs   clinically denies most symptoms, though has a few potential ones   continue same dose for now, 137 mcg/day.   consider dose adjustment based on results   goal is a normal TSH.

## 2023-03-03 LAB
25(OH)D3+25(OH)D2 SERPL-MCNC: 25 NG/ML (ref 30–96)
ALBUMIN SERPL BCP-MCNC: 4.1 G/DL (ref 3.5–5.2)
ALP SERPL-CCNC: 120 U/L (ref 55–135)
ALT SERPL W/O P-5'-P-CCNC: 60 U/L (ref 10–44)
ANION GAP SERPL CALC-SCNC: 12 MMOL/L (ref 8–16)
AST SERPL-CCNC: 26 U/L (ref 10–40)
BILIRUB SERPL-MCNC: 0.3 MG/DL (ref 0.1–1)
BUN SERPL-MCNC: 15 MG/DL (ref 6–20)
CALCIUM SERPL-MCNC: 10.2 MG/DL (ref 8.7–10.5)
CHLORIDE SERPL-SCNC: 104 MMOL/L (ref 95–110)
CHOLEST SERPL-MCNC: 187 MG/DL (ref 120–199)
CHOLEST/HDLC SERPL: 4.3 {RATIO} (ref 2–5)
CO2 SERPL-SCNC: 25 MMOL/L (ref 23–29)
CREAT SERPL-MCNC: 1.2 MG/DL (ref 0.5–1.4)
EST. GFR  (NO RACE VARIABLE): 53.8 ML/MIN/1.73 M^2
ESTIMATED AVG GLUCOSE: 151 MG/DL (ref 68–131)
GLUCOSE SERPL-MCNC: 322 MG/DL (ref 70–110)
HBA1C MFR BLD: 6.9 % (ref 4–5.6)
HDLC SERPL-MCNC: 44 MG/DL (ref 40–75)
HDLC SERPL: 23.5 % (ref 20–50)
LDLC SERPL CALC-MCNC: ABNORMAL MG/DL (ref 63–159)
NONHDLC SERPL-MCNC: 143 MG/DL
POTASSIUM SERPL-SCNC: 3.5 MMOL/L (ref 3.5–5.1)
PROT SERPL-MCNC: 7.4 G/DL (ref 6–8.4)
PTH-INTACT SERPL-MCNC: 87.3 PG/ML (ref 9–77)
SODIUM SERPL-SCNC: 141 MMOL/L (ref 136–145)
T4 FREE SERPL-MCNC: 1.38 NG/DL (ref 0.71–1.51)
TRIGL SERPL-MCNC: 456 MG/DL (ref 30–150)
TSH SERPL DL<=0.005 MIU/L-ACNC: 0.07 UIU/ML (ref 0.4–4)

## 2023-03-07 ENCOUNTER — PATIENT MESSAGE (OUTPATIENT)
Dept: ENDOCRINOLOGY | Facility: CLINIC | Age: 53
End: 2023-03-07
Payer: COMMERCIAL

## 2023-03-07 DIAGNOSIS — E06.3 HASHIMOTO'S DISEASE: Primary | ICD-10-CM

## 2023-03-10 NOTE — TELEPHONE ENCOUNTER
Lab Results   Component Value Date    TSH 0.066 (L) 03/02/2023    [id number] 85 01/18/2019    FREET4 1.38 03/02/2023     TSH low, t4 normal    Levothyroxine -> half tablet one day/week    Last labs:    Lab Results   Component Value Date    CALCIUM 10.2 03/02/2023    ALBUMIN 4.1 03/02/2023    CREATININE 1.2 03/02/2023    [id number] 25 (L) 03/02/2023    PTH 87.3 (H) 03/02/2023       PTH high  Vit D low    Lab Results   Component Value Date    HGBA1C 6.9 (H) 03/02/2023     Lab Results   Component Value Date    LDLCALC Invalid, Trig>400.0 03/02/2023     Trig very high

## 2023-06-20 DIAGNOSIS — E11.65 TYPE 2 DIABETES MELLITUS WITH HYPERGLYCEMIA, WITHOUT LONG-TERM CURRENT USE OF INSULIN: ICD-10-CM

## 2023-06-20 RX ORDER — FLASH GLUCOSE SENSOR
KIT MISCELLANEOUS
Qty: 2 KIT | Refills: 11 | Status: SHIPPED | OUTPATIENT
Start: 2023-06-20

## 2023-06-20 NOTE — TELEPHONE ENCOUNTER
LV 03/02/23.  LL 03/02/23    Medication Renewal Request  Received: Today  Alis Costello Staff  Phone Number: [phone]     Refills have been requested for the following medications:         FREESTYLE ROSEMARIE 2 SENSOR Kit [RASHAD Rothman PA-C]       Patient Comment: I was not able to start this because it was not affordable, however my insurance has lowered the co-pay and I would like to try this again.  Thank you :)       Preferred pharmacy: [id number] - [place], [place] - [address] [place] [facility] [address]   Delivery method: Pickup

## 2023-10-16 ENCOUNTER — PATIENT MESSAGE (OUTPATIENT)
Dept: ENDOCRINOLOGY | Facility: CLINIC | Age: 53
End: 2023-10-16
Payer: COMMERCIAL

## 2025-05-16 DIAGNOSIS — E11.65 TYPE 2 DIABETES MELLITUS WITH HYPERGLYCEMIA, WITHOUT LONG-TERM CURRENT USE OF INSULIN: ICD-10-CM

## 2025-05-19 RX ORDER — FLASH GLUCOSE SENSOR
KIT MISCELLANEOUS
OUTPATIENT
Start: 2025-05-19

## 2025-07-08 ENCOUNTER — TELEPHONE (OUTPATIENT)
Dept: UROGYNECOLOGY | Facility: CLINIC | Age: 55
End: 2025-07-08
Payer: COMMERCIAL

## (undated) DEVICE — SYR ONLY LUER LOCK 20CC

## (undated) DEVICE — SET IRR URLGY 2LINE UNIV SPIKE

## (undated) DEVICE — SEE MEDLINE ITEM 157185

## (undated) DEVICE — GLOVE SURGEONS ULTRA TOUCH 6.5

## (undated) DEVICE — UNDERGLOVE BIOGEL PI SZ 6.5 LF

## (undated) DEVICE — UNDERGLOVES BIOGEL PI SZ 6 LF

## (undated) DEVICE — SCRUB HIBICLENS 4% CHG 4OZ

## (undated) DEVICE — SLEEVE SCD EXPRESS CALF MEDIUM

## (undated) DEVICE — SOL WATER STRL IRR 1000ML

## (undated) DEVICE — GAUZE SPONGE BULKEE 6X6.75IN

## (undated) DEVICE — LUBRICANT SURGILUBE 2 OZ

## (undated) DEVICE — SEE MEDLINE ITEM 157116

## (undated) DEVICE — SEE MEDLINE ITEM 152487